# Patient Record
Sex: MALE | Race: WHITE
[De-identification: names, ages, dates, MRNs, and addresses within clinical notes are randomized per-mention and may not be internally consistent; named-entity substitution may affect disease eponyms.]

---

## 2020-07-16 ENCOUNTER — HOSPITAL ENCOUNTER (OUTPATIENT)
Dept: HOSPITAL 84 - D.M2 | Age: 70
LOS: 1 days | End: 2020-07-17
Attending: INTERNAL MEDICINE
Payer: COMMERCIAL

## 2020-07-16 VITALS — SYSTOLIC BLOOD PRESSURE: 87 MMHG | DIASTOLIC BLOOD PRESSURE: 49 MMHG

## 2020-07-16 VITALS — DIASTOLIC BLOOD PRESSURE: 88 MMHG | SYSTOLIC BLOOD PRESSURE: 127 MMHG

## 2020-07-16 VITALS — HEIGHT: 69 IN | BODY MASS INDEX: 26.57 KG/M2 | WEIGHT: 179.38 LBS

## 2020-07-16 VITALS — SYSTOLIC BLOOD PRESSURE: 127 MMHG | DIASTOLIC BLOOD PRESSURE: 88 MMHG

## 2020-07-16 VITALS — SYSTOLIC BLOOD PRESSURE: 99 MMHG | DIASTOLIC BLOOD PRESSURE: 60 MMHG

## 2020-07-16 DIAGNOSIS — I10: ICD-10-CM

## 2020-07-16 DIAGNOSIS — N04.9: Primary | ICD-10-CM

## 2020-07-16 DIAGNOSIS — E78.2: ICD-10-CM

## 2020-07-16 LAB
ANION GAP SERPL CALC-SCNC: 7.6 MMOL/L (ref 8–16)
APTT BLD: 31.2 SECONDS (ref 22.8–39.4)
BUN SERPL-MCNC: 32 MG/DL (ref 7–18)
CALCIUM SERPL-MCNC: 8.3 MG/DL (ref 8.5–10.1)
CHLORIDE SERPL-SCNC: 108 MMOL/L (ref 98–107)
CO2 SERPL-SCNC: 28.2 MMOL/L (ref 21–32)
CREAT SERPL-MCNC: 1.6 MG/DL (ref 0.6–1.3)
ERYTHROCYTE [DISTWIDTH] IN BLOOD BY AUTOMATED COUNT: 18.1 % (ref 11.5–14.5)
ERYTHROCYTE [DISTWIDTH] IN BLOOD BY AUTOMATED COUNT: 18.3 % (ref 11.5–14.5)
ERYTHROCYTE [DISTWIDTH] IN BLOOD BY AUTOMATED COUNT: 18.3 % (ref 11.5–14.5)
GLUCOSE SERPL-MCNC: 118 MG/DL (ref 74–106)
HCT VFR BLD CALC: 41.4 % (ref 42–54)
HCT VFR BLD CALC: 42.3 % (ref 42–54)
HCT VFR BLD CALC: 46.5 % (ref 42–54)
HGB BLD-MCNC: 13.6 G/DL (ref 13.5–17.5)
HGB BLD-MCNC: 14.1 G/DL (ref 13.5–17.5)
HGB BLD-MCNC: 15.6 G/DL (ref 13.5–17.5)
INR PPP: 0.88 (ref 0.85–1.17)
LYMPHOCYTES NFR BLD AUTO: 19.5 % (ref 15–50)
LYMPHOCYTES NFR BLD AUTO: 23 % (ref 15–50)
LYMPHOCYTES NFR BLD AUTO: 23.1 % (ref 15–50)
MCH RBC QN AUTO: 30.3 PG (ref 26–34)
MCH RBC QN AUTO: 30.6 PG (ref 26–34)
MCH RBC QN AUTO: 30.6 PG (ref 26–34)
MCHC RBC AUTO-ENTMCNC: 32.9 G/DL (ref 31–37)
MCHC RBC AUTO-ENTMCNC: 33.3 G/DL (ref 31–37)
MCHC RBC AUTO-ENTMCNC: 33.5 G/DL (ref 31–37)
MCV RBC: 91.2 FL (ref 80–100)
MCV RBC: 91.8 FL (ref 80–100)
MCV RBC: 92.2 FL (ref 80–100)
NEUTROPHILS NFR BLD AUTO: 66.9 % (ref 40–80)
NEUTROPHILS NFR BLD AUTO: 67.6 % (ref 40–80)
NEUTROPHILS NFR BLD AUTO: 72.7 % (ref 40–80)
OSMOLALITY SERPL CALC.SUM OF ELEC: 285 MOSM/KG (ref 275–300)
PLATELET # BLD: 340 10X3/UL (ref 130–400)
PLATELET # BLD: 351 10X3/UL (ref 130–400)
PLATELET # BLD: 409 10X3/UL (ref 130–400)
PMV BLD AUTO: 8.2 FL (ref 7.4–10.4)
PMV BLD AUTO: 8.4 FL (ref 7.4–10.4)
PMV BLD AUTO: 8.6 FL (ref 7.4–10.4)
POTASSIUM SERPL-SCNC: 4.8 MMOL/L (ref 3.5–5.1)
PROTHROMBIN TIME: 11.9 SECONDS (ref 11.6–15)
RBC # BLD AUTO: 4.49 10X6/UL (ref 4.2–6.1)
RBC # BLD AUTO: 4.61 10X6/UL (ref 4.2–6.1)
RBC # BLD AUTO: 5.1 10X6/UL (ref 4.2–6.1)
SODIUM SERPL-SCNC: 139 MMOL/L (ref 136–145)
WBC # BLD AUTO: 6.6 10X3/UL (ref 4.8–10.8)
WBC # BLD AUTO: 7.8 10X3/UL (ref 4.8–10.8)
WBC # BLD AUTO: 8 10X3/UL (ref 4.8–10.8)

## 2020-07-16 NOTE — NUR
PATIENT IS RESTING COMFORTABLY IN BED. HIS HEAD ACHE IS GETTING BETTER. HIS
WIFE IS IN THE ROOM. HE STATES HE IS NOT A DIABETIC, AND WE ARE CHECKING HIS
BLOOD SUGARS. BP IS NO THE LOW SIDE OF NORMAL. WE WILL CONTINUE TO MONITOR HIS
BLOOD PRESSURE.

## 2020-07-17 VITALS — DIASTOLIC BLOOD PRESSURE: 69 MMHG | SYSTOLIC BLOOD PRESSURE: 112 MMHG

## 2020-07-17 VITALS — SYSTOLIC BLOOD PRESSURE: 93 MMHG | DIASTOLIC BLOOD PRESSURE: 57 MMHG

## 2020-07-17 VITALS — DIASTOLIC BLOOD PRESSURE: 78 MMHG | SYSTOLIC BLOOD PRESSURE: 185 MMHG

## 2020-07-17 LAB
ERYTHROCYTE [DISTWIDTH] IN BLOOD BY AUTOMATED COUNT: 18.1 % (ref 11.5–14.5)
HCT VFR BLD CALC: 39.9 % (ref 42–54)
HGB BLD-MCNC: 13.1 G/DL (ref 13.5–17.5)
LYMPHOCYTES NFR BLD AUTO: 26.2 % (ref 15–50)
MCH RBC QN AUTO: 30.3 PG (ref 26–34)
MCHC RBC AUTO-ENTMCNC: 32.8 G/DL (ref 31–37)
MCV RBC: 92.1 FL (ref 80–100)
NEUTROPHILS NFR BLD AUTO: 61.5 % (ref 40–80)
PLATELET # BLD: 350 10X3/UL (ref 130–400)
PMV BLD AUTO: 8.5 FL (ref 7.4–10.4)
RBC # BLD AUTO: 4.33 10X6/UL (ref 4.2–6.1)
WBC # BLD AUTO: 6.4 10X3/UL (ref 4.8–10.8)

## 2020-07-17 NOTE — NUR
PATIENT IS RESTING IN BED.  HE DID SLEEP SOME. HE IS VOIDING DARK URINE. I
ADVISED HIM TO DRINK MORE FLUIDS. WE WILL MONITOR HIS OUT PUT AND KIDNEY
FUNCTION.

## 2020-07-17 NOTE — NUR
1041 TAKEN TO CAR VIA   SPOUSE TO DRIVE HOME VERBAL AND WRITTEN DISCHARGE
INSTRUCTIONS GIVEN BOTH PT AND SPOUSE VERBBALIZED UNDERSTANDING

## 2020-08-25 ENCOUNTER — HOSPITAL ENCOUNTER (OUTPATIENT)
Dept: HOSPITAL 84 - D.ECHO | Age: 70
Discharge: HOME | End: 2020-08-25
Attending: INTERNAL MEDICINE
Payer: COMMERCIAL

## 2020-08-25 VITALS — BODY MASS INDEX: 26.5 KG/M2

## 2020-08-25 DIAGNOSIS — E85.9: Primary | ICD-10-CM

## 2020-08-26 NOTE — EC
PATIENT:YVON LOPEZ               DATE OF SERVICE: 08/25/20
SEX: M                                  MEDICAL RECORD: [id number]
DATE OF BIRTH: [birth year]                        [facility]          
AGE OF PATIENT: 69                             ADMISSION DATE: 08/25/20
 
REFERRING PHYSICIAN:                               
 
INTERPRETING PHYSICIAN: CHELA STEWART MD          
 
 
 
                             ECHOCARDIOGRAM REPORT
  ECHO CHARGES 4               ECHO COMPLETE                 Date: 08/25/20
 
 
 
CLINICAL DIAGNOSIS: AMYLOID                       
 
                         ECHOCARDIOGRAPHIC MEASUREMENTS
      (adult normal given)
   AC root (d.<3.7cm) 3.7  cm   LV Septum d (<1.2 cm> 1.5  cm
      Valve Excursion 2.0  cm     LV Septum (systole) 1.6  cm
Left Atria (s.<4.0cm> 3.5  cm          LVPW d(<1.2cm) 0.9  cm
        RV (d.<2.3cm) 2.4  cm           LVPW (sytole) 1.5  cm
  LV diastole(<5.6CM) 4.9  cm       MV E-F(>70mm/sec)      cm
           LV systole 3.9  cm           LVOT Diameter 1.6  cm
       MV exc.(>10mm)      cm
Est.ejection fraction (50-75%)     %
 
   DOPPLER:
     LVIT      cm/sec A 51   cm/sec E 41    cm/sec
       LA      cm/sec      RVSP 28.3 mmHg
     LVOT 172  cm/sec   AOP1/2T      m/s
  Asc. Ao 145  cm/sec
     RVOT 73   cm/sec
       RA      cm/sec
       PA 66   cm/sec
 AV Gradient Peak 8.4  mmHg  AV Mean 4.6  mmHg  AV Area 2.7  cm
 MV Gradient Peak 1.8  mmHg  MV Mean 0.8  mmHg  MV Area      cm
   COMMENTS:                                              
 
 
 Cardiac Sonographer: Jessica               FRANKLIN DOWNS             
      Cardiologist: 3          Dr. Bro             
             TAPE# PACS           
                                       Pericardial Effusion N                        
 
 
DATE OF SERVICE:  08/25/2020
 
Adequate 2D, color flow imaging, spectral Doppler, and M-Mode.
 
LVH is present.  LV internal dimension is normal.  Wall motion is normal.  EF is
greater than or equal to 55%.  Aortic valve is tricuspid.  No evidence of
stenosis by Doppler interrogation.  Left atrium is normal.  Mitral valve shows
no prolapse.  Trace MR.  Right-sided chambers are grossly normal.  Trace TR.
 
TRANSINT:[id number] Voice Confirmation ID: [id number] DOCUMENT ID: [id number]
 
 
 
ECHOCARDIOGRAM REPORT                          [id number]    JESSICA,YVON CHELA VICK MD          
 
 
 
Electronically Signed by CHELA STEWART on 08/26/20 at 0818
 
 
 
 
 
 
 
 
 
 
 
 
 
 
 
 
 
 
 
 
 
 
 
 
 
 
 
 
 
 
 
 
 
 
 
 
 
 
 
 
CC:                                                             [id number]
DICTATION DATE: 08/25/20 1243     :     08/25/20 2026      DEP CLI 
                                                                      08/25/20
[address] [place] [address]

## 2020-08-31 ENCOUNTER — HOSPITAL ENCOUNTER (OUTPATIENT)
Dept: HOSPITAL 84 - D.SP | Age: 70
Discharge: HOME | End: 2020-08-31
Attending: INTERNAL MEDICINE
Payer: COMMERCIAL

## 2020-08-31 VITALS
WEIGHT: 168.35 LBS | BODY MASS INDEX: 24.93 KG/M2 | WEIGHT: 168.35 LBS | HEIGHT: 69 IN | BODY MASS INDEX: 24.93 KG/M2 | HEIGHT: 69 IN

## 2020-08-31 DIAGNOSIS — R11.0: ICD-10-CM

## 2020-08-31 DIAGNOSIS — Z01.89: ICD-10-CM

## 2020-08-31 DIAGNOSIS — E85.9: Primary | ICD-10-CM

## 2020-08-31 LAB
ANION GAP SERPL CALC-SCNC: 12.3 MMOL/L (ref 8–16)
APTT BLD: 32.6 SECONDS (ref 22.8–39.4)
BASOPHILS NFR BLD AUTO: 0.8 % (ref 0–2)
BUN SERPL-MCNC: 54 MG/DL (ref 7–18)
CALCIUM SERPL-MCNC: 8.2 MG/DL (ref 8.5–10.1)
CHLORIDE SERPL-SCNC: 104 MMOL/L (ref 98–107)
CO2 SERPL-SCNC: 24.3 MMOL/L (ref 21–32)
CREAT SERPL-MCNC: 2.9 MG/DL (ref 0.6–1.3)
EOSINOPHIL NFR BLD: 6.1 % (ref 0–7)
ERYTHROCYTE [DISTWIDTH] IN BLOOD BY AUTOMATED COUNT: 18.7 % (ref 11.5–14.5)
GLUCOSE SERPL-MCNC: 102 MG/DL (ref 74–106)
HCT VFR BLD CALC: 44.8 % (ref 42–54)
HGB BLD-MCNC: 15.7 G/DL (ref 13.5–17.5)
IMM GRANULOCYTES NFR BLD: 5 % (ref 0–5)
INR PPP: 0.98 (ref 0.85–1.17)
LYMPHOCYTES NFR BLD AUTO: 15 % (ref 15–50)
MCH RBC QN AUTO: 30.2 PG (ref 26–34)
MCHC RBC AUTO-ENTMCNC: 35 G/DL (ref 31–37)
MCV RBC: 86.2 FL (ref 80–100)
MONOCYTES NFR BLD: 5.2 % (ref 2–11)
NEUTROPHILS NFR BLD AUTO: 67.9 % (ref 40–80)
OSMOLALITY SERPL CALC.SUM OF ELEC: 288 MOSM/KG (ref 275–300)
PLATELET # BLD: 313 10X3/UL (ref 130–400)
PMV BLD AUTO: 9.3 FL (ref 7.4–10.4)
POTASSIUM SERPL-SCNC: 3.6 MMOL/L (ref 3.5–5.1)
PROTHROMBIN TIME: 13 SECONDS (ref 11.6–15)
RBC # BLD AUTO: 5.2 10X6/UL (ref 4.2–6.1)
SODIUM SERPL-SCNC: 137 MMOL/L (ref 136–145)
WBC # BLD AUTO: 7.2 10X3/UL (ref 4.8–10.8)

## 2020-09-09 ENCOUNTER — HOSPITAL ENCOUNTER (INPATIENT)
Dept: HOSPITAL 84 - D.ER | Age: 70
LOS: 3 days | Discharge: HOME | DRG: 193 | End: 2020-09-12
Attending: FAMILY MEDICINE | Admitting: FAMILY MEDICINE
Payer: COMMERCIAL

## 2020-09-09 VITALS
BODY MASS INDEX: 23.55 KG/M2 | BODY MASS INDEX: 23.55 KG/M2 | BODY MASS INDEX: 23.55 KG/M2 | WEIGHT: 159 LBS | HEIGHT: 69 IN | HEIGHT: 69 IN | WEIGHT: 159 LBS

## 2020-09-09 VITALS — DIASTOLIC BLOOD PRESSURE: 71 MMHG | SYSTOLIC BLOOD PRESSURE: 99 MMHG

## 2020-09-09 DIAGNOSIS — E85.9: ICD-10-CM

## 2020-09-09 DIAGNOSIS — I12.9: ICD-10-CM

## 2020-09-09 DIAGNOSIS — J18.9: Primary | ICD-10-CM

## 2020-09-09 DIAGNOSIS — N18.9: ICD-10-CM

## 2020-09-09 DIAGNOSIS — E43: ICD-10-CM

## 2020-09-09 DIAGNOSIS — M35.3: ICD-10-CM

## 2020-09-09 DIAGNOSIS — N17.9: ICD-10-CM

## 2020-09-09 DIAGNOSIS — K21.9: ICD-10-CM

## 2020-09-09 DIAGNOSIS — E87.6: ICD-10-CM

## 2020-09-09 DIAGNOSIS — F32.9: ICD-10-CM

## 2020-09-09 LAB
ALBUMIN SERPL-MCNC: 1 G/DL (ref 3.4–5)
ALP SERPL-CCNC: 102 U/L (ref 30–120)
ALT SERPL-CCNC: 35 U/L (ref 10–68)
AMYLASE SERPL-CCNC: 50 U/L (ref 25–115)
ANION GAP SERPL CALC-SCNC: 13.8 MMOL/L (ref 8–16)
APTT BLD: 37.8 SECONDS (ref 22.8–39.4)
BASOPHILS NFR BLD AUTO: 0.2 % (ref 0–2)
BILIRUB SERPL-MCNC: 0.45 MG/DL (ref 0.2–1.3)
BUN SERPL-MCNC: 43 MG/DL (ref 7–18)
CALCIUM SERPL-MCNC: 8 MG/DL (ref 8.5–10.1)
CHLORIDE SERPL-SCNC: 103 MMOL/L (ref 98–107)
CO2 SERPL-SCNC: 23.2 MMOL/L (ref 21–32)
CREAT SERPL-MCNC: 2.8 MG/DL (ref 0.6–1.3)
EOSINOPHIL NFR BLD: 0.1 % (ref 0–7)
ERYTHROCYTE [DISTWIDTH] IN BLOOD BY AUTOMATED COUNT: 18.9 % (ref 11.5–14.5)
GLOBULIN SER-MCNC: 4.1 G/L
GLUCOSE SERPL-MCNC: 95 MG/DL (ref 74–106)
HCT VFR BLD CALC: 43.8 % (ref 42–54)
HGB BLD-MCNC: 15.5 G/DL (ref 13.5–17.5)
IMM GRANULOCYTES NFR BLD: 0.2 % (ref 0–5)
INR PPP: 1.04 (ref 0.85–1.17)
LIPASE SERPL-CCNC: 150 U/L (ref 73–393)
LYMPHOCYTES NFR BLD AUTO: 8.3 % (ref 15–50)
MCH RBC QN AUTO: 30.4 PG (ref 26–34)
MCHC RBC AUTO-ENTMCNC: 35.4 G/DL (ref 31–37)
MCV RBC: 85.9 FL (ref 80–100)
MONOCYTES NFR BLD: 5.4 % (ref 2–11)
NEUTROPHILS NFR BLD AUTO: 85.8 % (ref 40–80)
OSMOLALITY SERPL CALC.SUM OF ELEC: 284 MOSM/KG (ref 275–300)
PLATELET # BLD: 304 10X3/UL (ref 130–400)
PMV BLD AUTO: 10 FL (ref 7.4–10.4)
POTASSIUM SERPL-SCNC: 3 MMOL/L (ref 3.5–5.1)
PROT SERPL-MCNC: 5.1 G/DL (ref 6.4–8.2)
PROTHROMBIN TIME: 13.5 SECONDS (ref 11.6–15)
RBC # BLD AUTO: 5.1 10X6/UL (ref 4.2–6.1)
SODIUM SERPL-SCNC: 137 MMOL/L (ref 136–145)
TROPONIN I SERPL-MCNC: 0.11 NG/ML (ref 0–0.06)
WBC # BLD AUTO: 10.8 10X3/UL (ref 4.8–10.8)

## 2020-09-09 NOTE — OP
PATIENT NAME:  YVON LOPEZ                         MEDICAL RECORD: [id number]
:[birth year]                                             LOCATION:D.M2     D.2104
                                                         ADMISSION DATE:20
SURGEON:  GUILLERMO SUBRAMANIAN MD          
 
 
DATE OF OPERATION:  2020
 
PREOPERATIVE DIAGNOSES:
1.  Amyloidosis.
2.  Nephrotic syndrome.
3.  Acute kidney injury.
4.  Hypertension.
5.  Gastroesophageal reflux disease.
6.  Polymyalgia.
 
POSTOPERATIVE DIAGNOSES:
1.  Amyloidosis.
2.  Nephrotic syndrome.
3.  Acute kidney injury.
4.  Hypertension.
5.  Gastroesophageal reflux disease.
6.  Polymyalgia.
 
PROCEDURES:
1.  Left subclavian vein port placements.
2.  Fluoroscopic interpretation.
 
SURGEON:  Guillermo Subramanian MD
 
REPORT OF PROCEDURE:  The patient's left chest was prepped and draped in sterile
fashion.  A needle was used to cannulate the left subclavian vein and a
guidewire was advanced with ease.  Fluoro was used to note that the wire was in
good position in the venous system.  A skin incision was made on the left
superior lateral chest and a subcutaneous pouch was made over the pectoral
fascia.  The catheter was then tunneled between this pouch and the wire exit
site.  The port was sutured to the pectoral fascia with interrupted 3-0 Prolenes
times 2.  The catheter was cut with a beveled tip at 25 cm.  The dilator trocar
device was then placed over the wire and the wire and dilator were removed.  The
catheter tip was advanced through the trocar and the trocar was removed.  The
catheter tip was noted to be in good position at the right atrial superior vena
caval junction.  The catheter aspirated nonpulsatile dark blood and flushed
easily with heparinized saline.  This was sutured into place with interrupted
subcutaneous 3-0 Vicryl and the skin was closed with running subcutaneous 5-0
Monocryl.
 
COMPLICATIONS:  None.
 
CONDITION:  Stable.
 
ANESTHESIA:  General endotracheal and local.
 
BLOOD LOSS:  Minimal.
 
TRANSINT:[id number] Voice Confirmation ID: [id number] DOCUMENT ID: [id number]
                                           
 
 
 
OPERATIVE REPORT                               [id number]    YVON LOPEZ       
 
 
                                           GUILLERMO SUBRAMANIAN MD          
 
 
 
 
 
 
 
 
 
 
 
 
 
 
 
 
 
 
 
 
 
 
 
 
 
 
 
 
 
 
 
 
 
 
 
 
 
 
 
 
 
 
 
 
 
 
CC:                                                             [id number]
DICTATION DATE: 20 1045     :     20 1516      DIS IN  
                                                                      20
[id number] [place] [address]

## 2020-09-10 VITALS — SYSTOLIC BLOOD PRESSURE: 116 MMHG | DIASTOLIC BLOOD PRESSURE: 68 MMHG

## 2020-09-10 VITALS — SYSTOLIC BLOOD PRESSURE: 107 MMHG | DIASTOLIC BLOOD PRESSURE: 62 MMHG

## 2020-09-10 VITALS — DIASTOLIC BLOOD PRESSURE: 57 MMHG | SYSTOLIC BLOOD PRESSURE: 96 MMHG

## 2020-09-10 VITALS — SYSTOLIC BLOOD PRESSURE: 120 MMHG | DIASTOLIC BLOOD PRESSURE: 70 MMHG

## 2020-09-10 VITALS — SYSTOLIC BLOOD PRESSURE: 129 MMHG | DIASTOLIC BLOOD PRESSURE: 59 MMHG

## 2020-09-10 VITALS — DIASTOLIC BLOOD PRESSURE: 41 MMHG | SYSTOLIC BLOOD PRESSURE: 95 MMHG

## 2020-09-10 VITALS — SYSTOLIC BLOOD PRESSURE: 114 MMHG | DIASTOLIC BLOOD PRESSURE: 74 MMHG

## 2020-09-10 LAB
ALBUMIN SERPL-MCNC: 0.8 G/DL (ref 3.4–5)
ALP SERPL-CCNC: 93 U/L (ref 30–120)
ALT SERPL-CCNC: 29 U/L (ref 10–68)
AMORPHOUS SEDIMENT: (no result) /LPF
ANION GAP SERPL CALC-SCNC: 11.9 MMOL/L (ref 8–16)
BACTERIA #/AREA URNS HPF: (no result) /HPF
BACTERIA #/AREA URNS HPF: (no result) /HPF
BASOPHILS NFR BLD AUTO: 0.5 % (ref 0–2)
BILIRUB SERPL-MCNC: 0.33 MG/DL (ref 0.2–1.3)
BILIRUB SERPL-MCNC: NEGATIVE MG/DL
BILIRUB SERPL-MCNC: NEGATIVE MG/DL
BUN SERPL-MCNC: 39 MG/DL (ref 7–18)
CALCIUM SERPL-MCNC: 7.6 MG/DL (ref 8.5–10.1)
CHLORIDE SERPL-SCNC: 108 MMOL/L (ref 98–107)
CHOLEST/HDLC SERPL: 20.4 RATIO (ref 2.3–4.9)
CK MB SERPL-MCNC: 3.4 U/L (ref 0–3.6)
CK MB SERPL-MCNC: 3.6 U/L (ref 0–3.6)
CK SERPL-CCNC: 112 UL (ref 21–232)
CK SERPL-CCNC: 95 UL (ref 21–232)
CO2 SERPL-SCNC: 23.3 MMOL/L (ref 21–32)
CREAT SERPL-MCNC: 2.5 MG/DL (ref 0.6–1.3)
EOSINOPHIL NFR BLD: 0.8 % (ref 0–7)
ERYTHROCYTE [DISTWIDTH] IN BLOOD BY AUTOMATED COUNT: 19 % (ref 11.5–14.5)
GLOBULIN SER-MCNC: 3.6 G/L
GLUCOSE SERPL-MCNC: 74 MG/DL (ref 74–106)
GRAN CASTS #/AREA URNS LPF: (no result) /LPF
GRAN CASTS #/AREA URNS LPF: (no result) /LPF
HCT VFR BLD CALC: 40.1 % (ref 42–54)
HDLC SERPL-MCNC: 21 MG/DL (ref 32–96)
HGB BLD-MCNC: 14 G/DL (ref 13.5–17.5)
IMM GRANULOCYTES NFR BLD: 0.2 % (ref 0–5)
KETONES UR STRIP-MCNC: (no result) MG/DL
KETONES UR STRIP-MCNC: NEGATIVE MG/DL
LDL-HDL RATIO: 16.9 RATIO (ref 1.5–3.5)
LDLC SERPL-MCNC: 354 MG/DL (ref 0–100)
LYMPHOCYTES NFR BLD AUTO: 11.9 % (ref 15–50)
MAGNESIUM SERPL-MCNC: 2 MG/DL (ref 1.8–2.4)
MCH RBC QN AUTO: 30 PG (ref 26–34)
MCHC RBC AUTO-ENTMCNC: 34.9 G/DL (ref 31–37)
MCV RBC: 86.1 FL (ref 80–100)
MONOCYTES NFR BLD: 6.1 % (ref 2–11)
NEUTROPHILS NFR BLD AUTO: 80.5 % (ref 40–80)
NITRITE UR-MCNC: NEGATIVE MG/ML
NITRITE UR-MCNC: NEGATIVE MG/ML
OSMOLALITY SERPL CALC.SUM OF ELEC: 286 MOSM/KG (ref 275–300)
PH UR STRIP: 5 [PH] (ref 5–6)
PH UR STRIP: 5 [PH] (ref 5–8)
PLATELET # BLD: 274 10X3/UL (ref 130–400)
PMV BLD AUTO: 9.2 FL (ref 7.4–10.4)
POTASSIUM SERPL-SCNC: 3.2 MMOL/L (ref 3.5–5.1)
PROT SERPL-MCNC: 4.4 G/DL (ref 6.4–8.2)
RBC # BLD AUTO: 4.66 10X6/UL (ref 4.2–6.1)
SODIUM SERPL-SCNC: 140 MMOL/L (ref 136–145)
SQUAMOUS #/AREA URNS HPF: (no result) /HPF (ref 0–5)
TRIGL SERPL-MCNC: 272 MG/DL (ref 30–200)
TROPONIN I SERPL-MCNC: 0.09 NG/ML (ref 0–0.06)
TROPONIN I SERPL-MCNC: 0.13 NG/ML (ref 0–0.06)
UROBILINOGEN UR-MCNC: NORMAL MG/DL
UROBILINOGEN UR-MCNC: NORMAL MG/DL (ref ?–2)
WBC # BLD AUTO: 8.4 10X3/UL (ref 4.8–10.8)
WBC #/AREA URNS HPF: (no result) /HPF (ref 0–1)
WBC #/AREA URNS HPF: (no result) /HPF (ref 0–5)

## 2020-09-10 NOTE — NUR
RECIEVE REPORT. ALERT AND ORIENTED X4. SITTING UP IN BED. FAMILY AT BEDSIDE.
FOLLOW ELECTROLYTE PROTOCOL. DENIES ANY NEEDS. CONTINUE PLAN OF CARE AND
SAFETY PRECAUTIONS.

## 2020-09-10 NOTE — NUR
REPORT RECEIVED, WILL CONTINUE POC. PATIENT IS AAOX4, SITTING UP ON SIDE OF
BED. NO S/S OF DISTRESS OBSERVED, RR EVEN AND UNLABORED ON ROOM AIR. PATIENT
DENIES NEEDS AT THIS TIME. CL IN REACH, BED LOCKED AND LOWERED. WILL CTM.

## 2020-09-10 NOTE — NUR
ALERT AND ORIENTED X4. SITTING UP IN BED. CONSENTS FOR PORT PLACEMENT SIGNED
ON CHART. DENIES ANY NEEDS. CONTINUE PLAN OF CARE AND SAFETY PRECAUTIONS.

## 2020-09-11 VITALS — DIASTOLIC BLOOD PRESSURE: 5 MMHG | SYSTOLIC BLOOD PRESSURE: 95 MMHG

## 2020-09-11 VITALS — SYSTOLIC BLOOD PRESSURE: 85 MMHG | DIASTOLIC BLOOD PRESSURE: 46 MMHG

## 2020-09-11 VITALS — SYSTOLIC BLOOD PRESSURE: 103 MMHG | DIASTOLIC BLOOD PRESSURE: 50 MMHG

## 2020-09-11 VITALS — SYSTOLIC BLOOD PRESSURE: 104 MMHG | DIASTOLIC BLOOD PRESSURE: 45 MMHG

## 2020-09-11 VITALS — DIASTOLIC BLOOD PRESSURE: 63 MMHG | SYSTOLIC BLOOD PRESSURE: 121 MMHG

## 2020-09-11 VITALS — SYSTOLIC BLOOD PRESSURE: 121 MMHG | DIASTOLIC BLOOD PRESSURE: 63 MMHG

## 2020-09-11 VITALS — SYSTOLIC BLOOD PRESSURE: 92 MMHG | DIASTOLIC BLOOD PRESSURE: 57 MMHG

## 2020-09-11 LAB
ALBUMIN SERPL-MCNC: 1.5 G/DL (ref 3.4–5)
ALP SERPL-CCNC: 84 U/L (ref 30–120)
ALT SERPL-CCNC: 19 U/L (ref 10–68)
ANION GAP SERPL CALC-SCNC: 13.7 MMOL/L (ref 8–16)
BASOPHILS NFR BLD AUTO: 0.7 % (ref 0–2)
BILIRUB SERPL-MCNC: 0.44 MG/DL (ref 0.2–1.3)
BUN SERPL-MCNC: 36 MG/DL (ref 7–18)
CALCIUM SERPL-MCNC: 7.2 MG/DL (ref 8.5–10.1)
CHLORIDE SERPL-SCNC: 111 MMOL/L (ref 98–107)
CK MB SERPL-MCNC: 3.6 U/L (ref 0–3.6)
CK SERPL-CCNC: 94 UL (ref 21–232)
CO2 SERPL-SCNC: 20.7 MMOL/L (ref 21–32)
CREAT SERPL-MCNC: 2.5 MG/DL (ref 0.6–1.3)
CREATININE - URINE: 105.6 MG/DL (ref 30–125)
EOSINOPHIL NFR BLD: 1.5 % (ref 0–7)
ERYTHROCYTE [DISTWIDTH] IN BLOOD BY AUTOMATED COUNT: 19.4 % (ref 11.5–14.5)
GLOBULIN SER-MCNC: 2.8 G/L
GLUCOSE SERPL-MCNC: 71 MG/DL (ref 74–106)
HCT VFR BLD CALC: 32.9 % (ref 42–54)
HGB BLD-MCNC: 11.3 G/DL (ref 13.5–17.5)
IMM GRANULOCYTES NFR BLD: 0.4 % (ref 0–5)
LYMPHOCYTES NFR BLD AUTO: 11.5 % (ref 15–50)
MAGNESIUM SERPL-MCNC: 2 MG/DL (ref 1.8–2.4)
MCH RBC QN AUTO: 29.9 PG (ref 26–34)
MCHC RBC AUTO-ENTMCNC: 34.3 G/DL (ref 31–37)
MCV RBC: 87 FL (ref 80–100)
MONOCYTES NFR BLD: 7.6 % (ref 2–11)
NEUTROPHILS NFR BLD AUTO: 78.3 % (ref 40–80)
OSMOLALITY SERPL CALC.SUM OF ELEC: 288 MOSM/KG (ref 275–300)
PLATELET # BLD: 263 10X3/UL (ref 130–400)
PMV BLD AUTO: 9.3 FL (ref 7.4–10.4)
POTASSIUM SERPL-SCNC: 3.4 MMOL/L (ref 3.5–5.1)
PROT SERPL-MCNC: 4.3 G/DL (ref 6.4–8.2)
PROT UR-MCNC: 3371.2 MG/DL (ref 0–11.9)
PROT/CREAT UR: 31.9 MG/G
RBC # BLD AUTO: 3.78 10X6/UL (ref 4.2–6.1)
SODIUM SERPL-SCNC: 142 MMOL/L (ref 136–145)
TROPONIN I SERPL-MCNC: 0.23 NG/ML (ref 0–0.06)
WBC # BLD AUTO: 7.5 10X3/UL (ref 4.8–10.8)

## 2020-09-11 PROCEDURE — 02HV33Z INSERTION OF INFUSION DEVICE INTO SUPERIOR VENA CAVA, PERCUTANEOUS APPROACH: ICD-10-PCS | Performed by: SURGERY

## 2020-09-11 PROCEDURE — B5181ZA FLUOROSCOPY OF SUPERIOR VENA CAVA USING LOW OSMOLAR CONTRAST, GUIDANCE: ICD-10-PCS | Performed by: SURGERY

## 2020-09-11 NOTE — NUR
AM ROUNDING DONE WITH WIFE AT BEDSIDE. PATIENT IS NPO FOR SURGERY TODAY. EKG
DONE. PATIENT UP TO SINK FOR AM CARE. ON HEART MONITOR SHOWING SR. ON ROOM
AIR. RIGHT AC SEEN WITH SALINE LOCK.

## 2020-09-11 NOTE — NUR
RECEIVED BACK FROM RECOVERY. DRESSING TO LEFT PORT AREA CLEAN AND DRY, INTACT.
FREQUENT VITAL SIGNS BEING DONE.

## 2020-09-12 VITALS — SYSTOLIC BLOOD PRESSURE: 118 MMHG | DIASTOLIC BLOOD PRESSURE: 68 MMHG

## 2020-09-12 VITALS — DIASTOLIC BLOOD PRESSURE: 64 MMHG | SYSTOLIC BLOOD PRESSURE: 115 MMHG

## 2020-09-12 VITALS — DIASTOLIC BLOOD PRESSURE: 70 MMHG | SYSTOLIC BLOOD PRESSURE: 116 MMHG

## 2020-09-12 LAB
ALBUMIN SERPL-MCNC: 1.2 G/DL (ref 3.4–5)
ALP SERPL-CCNC: 120 U/L (ref 30–120)
ALT SERPL-CCNC: 20 U/L (ref 10–68)
ANION GAP SERPL CALC-SCNC: 13 MMOL/L (ref 8–16)
BASOPHILS NFR BLD AUTO: 0.8 % (ref 0–2)
BILIRUB SERPL-MCNC: 0.33 MG/DL (ref 0.2–1.3)
BUN SERPL-MCNC: 33 MG/DL (ref 7–18)
CALCIUM SERPL-MCNC: 7.5 MG/DL (ref 8.5–10.1)
CHLORIDE SERPL-SCNC: 110 MMOL/L (ref 98–107)
CO2 SERPL-SCNC: 21.6 MMOL/L (ref 21–32)
CREAT SERPL-MCNC: 2.7 MG/DL (ref 0.6–1.3)
CREAT UR-MCNC: 94 MG/DL
EOSINOPHIL NFR BLD: 2.1 % (ref 0–7)
ERYTHROCYTE [DISTWIDTH] IN BLOOD BY AUTOMATED COUNT: 19.7 % (ref 11.5–14.5)
GLOBULIN SER-MCNC: 3.2 G/L
GLUCOSE SERPL-MCNC: 83 MG/DL (ref 74–106)
HCT VFR BLD CALC: 37 % (ref 42–54)
HGB BLD-MCNC: 12.7 G/DL (ref 13.5–17.5)
IMM GRANULOCYTES NFR BLD: 0.3 % (ref 0–5)
LYMPHOCYTES NFR BLD AUTO: 11.1 % (ref 15–50)
MAGNESIUM SERPL-MCNC: 2.1 MG/DL (ref 1.8–2.4)
MCH RBC QN AUTO: 30.2 PG (ref 26–34)
MCHC RBC AUTO-ENTMCNC: 34.3 G/DL (ref 31–37)
MCV RBC: 88.1 FL (ref 80–100)
MICROALB/CREAT RATIO: (no result)
MICROALBUMIN UR-MCNC: (no result) UG/ML
MONOCYTES NFR BLD: 5.7 % (ref 2–11)
NEUTROPHILS NFR BLD AUTO: 80 % (ref 40–80)
OSMOLALITY SERPL CALC.SUM OF ELEC: 286 MOSM/KG (ref 275–300)
PLATELET # BLD: 265 10X3/UL (ref 130–400)
PMV BLD AUTO: 8.8 FL (ref 7.4–10.4)
POTASSIUM SERPL-SCNC: 3.6 MMOL/L (ref 3.5–5.1)
PROT SERPL-MCNC: 4.4 G/DL (ref 6.4–8.2)
RBC # BLD AUTO: 4.2 10X6/UL (ref 4.2–6.1)
SODIUM SERPL-SCNC: 141 MMOL/L (ref 136–145)
WBC # BLD AUTO: 7.3 10X3/UL (ref 4.8–10.8)

## 2020-09-12 NOTE — MORECARE
CASE MANAGEMENT DISCHARGE SUMMARY
 
 
PATIENT: YVON LOPEZ                   UNIT: [id number]
ACCOUNT#: [id number]                       ADM DATE: 20
AGE: 69     : 50  SEX: M            ROOM/BED: D.7294    
AUTHOR: JULIA,DOC                             PHYSICIAN:                               
 
REFERRING PHYSICIAN: GHADA ESTRADA MD          
DATE OF SERVICE: 20
Discharge Plan
 
 
Patient Name: YVON LOPEZ
Facility: [facility]:[place]
Encounter #: [id number]
Medical Record #: [id number]
: 1950
Planned Disposition: Home or Self Care
Anticipated Discharge Date: 20
 
Discharge Date: 
Expected LOS: 3
Initial Reviewer: MONTSE
Initial Review Date: 2020
Generated: 20  12:19 pm 
Comments
 
DCP- Discharge Planning
 
Updated by [id number]: Aditya Sena on 20  10:17 am CT
Patient Name: YVON LOPEZ                                     
Admission Status: ER   
Accout number: [id number]                              
Admission Date: 2020   
: 1950                                                        
Admission Diagnosis:NAUSEA WITH VOMITING, UNSPECIFIED   
Attending: ALBERTO                                                
Current LOS:  3   
  
Anticipated DC Date: 2020   
Planned Disposition: Home or Self Care   
Primary Insurance: [employer]   
  
  
Discharge Planning Comments:   
CM met with patient to complete initial dc planning assessment.  CM educated 
patient on the CM role and verbal consent given by patient to complete 
assessment.  CM verified patient's address, phone number, and emergency 
contact phone numbers.  Patient lives at  home with spouse, Emy Lopez.  
 
At discharge patient plans to return  home and feels this is a safe discharge.
  CM discussed availability of home health, rehab services, and medical 
equipment. Patient declines HH services at this time as well as DME, SNF, and 
IPR.  Informed patient that HH can be set up through his PCP if he later 
decides that he needs the service. Transportation provider at discharge will 
be his Wife, Emy Lopez [phone]. CM will continue to follow and will 
assist as needed with dc plans/needs.  
  
: Aditya Sena
 DCPIA - Discharge Planning Initial Assessment
 
Updated by [id number]: Aditya Sena on 20  11:05 am
*  Is the patient Alert and Oriented?
Yes
*  How many steps to enter\exit or inside your home? 3/0 *  PCP Dr. Adonay Gleason * 
Pharmacy
[facility] in [place]
*  Preadmission Environment
Home with Family
*  ADLs
Independent
*  Equipment
Walker
*  List name and contact numbers for known caregivers / representatives who 
currently or will assist patient after discharge:
Emy Lopez, [phone]
*  Verbal permission to speak to the caregivers and representatives has been 
obtained from the patient.
Yes
*  Community resources currently utilized
None
*  Please name any agencies selected above.
n/a
*  Additional services required to return to the preadmission environment?
No
*  Can the patient safely return to the preadmission environment?
Yes
*  Has this patient been hospitalized within the prior 30 days at any 
hospital?
No
 
 
 
 
 
 
 
Last DP export: 20  10:12 a
Patient Name: YVON LOPEZ
 
Encounter #: [id number]
Page [id number]
 
 
 
 
 
Electronically Signed by KARIME DUMONT on 20 at 1119
 
 
 
 
 
 
**All edits/amendments must be made on the electronic document**
 
DICTATION DATE: 20     : LESLIE  20 111     
RPT#: [id number]                                DC DATE:        
                                               STATUS: ADM IN  
[facility]
 [place] [address]
***END OF REPORT***

## 2020-09-12 NOTE — MORECARE
CASE MANAGEMENT DISCHARGE SUMMARY
 
 
PATIENT: YVON LOPEZ                   UNIT: [id number]
ACCOUNT#: [id number]                       ADM DATE: 20
AGE: 69     : 50  SEX: M            ROOM/BED: D.2104    
AUTHOR: KARIME DUMONT                             PHYSICIAN:                               
 
REFERRING PHYSICIAN: GHADA ESTRADA MD          
DATE OF SERVICE: 20
Discharge Plan
 
 
Patient Name: YVON LOPEZ
Facility: [facility]:[place]
Encounter #: [id number]
Medical Record #: [id number]
: 1950
Planned Disposition: Home or Self Care
Anticipated Discharge Date: 20
 
Discharge Date: 
Expected LOS: 3
Initial Reviewer: [id number]
Initial Review Date: 2020
Generated: 20  12:04 pm 
  
 
 
 
 
 
 
 
Patient Name: YVON LOPEZ
 
Encounter #: [id number]
Page [id number]
 
 
 
 
 
Electronically Signed by KARIME DUMONT on 20 at 1105
 
 
 
 
 
 
**All edits/amendments must be made on the electronic document**
 
DICTATION DATE: 20 1104     : LESLIE  20 1104     
RPT#: [id number]                                DC DATE:        
                                               STATUS: ADM IN  
[facility]
 [place] [address]
***END OF REPORT***

## 2020-09-12 NOTE — NUR
D/C INSTRUCTIONS REVIEWED WITH PT AND SPOUSE. REQUESTED BEING SENT HOME WITH
NAUSEA MEDICATION. CALLED AND SPOKE WITH KAELA FIGUEROA. TED CALLED INTO
[facility] IN [place] PER ORDER TO JOSEPH HEARD TECH. IV D/C
WITH CATHETER TIP INTACT. TELEMETRY REMOVED AND RETURNED TO MONITOR TECH. PT
LEFT VIA WHEELCHAIR TO PERSONAL VEHICLE.

## 2020-09-12 NOTE — MORECARE
CASE MANAGEMENT DISCHARGE SUMMARY
 
 
PATIENT: YVON LOPEZ                   UNIT: [id number]
ACCOUNT#: [id number]                       ADM DATE: 20
AGE: 69     : 50  SEX: M            ROOM/BED: D.2104    
AUTHOR: KARIME DUMONT                             PHYSICIAN:                               
 
REFERRING PHYSICIAN: GHADA ESTRADA MD          
DATE OF SERVICE: 20
Discharge Plan
 
 
Patient Name: YVON LOPEZ
Facility: [facility]:[place]
Encounter #: [id number]
Medical Record #: [id number]
: 1950
Planned Disposition: Home or Self Care
Anticipated Discharge Date: 20
 
Discharge Date: 
Expected LOS: 3
Initial Reviewer: MONTSE
Initial Review Date: 2020
Generated: 20  12:11 pm 
 DCPIA - Discharge Planning Initial Assessment
 
Updated by [id number]: Aditya Sena on 20  11:05 am
*  Is the patient Alert and Oriented?
Yes
*  How many steps to enter\exit or inside your home? 3/0 *  PCP Dr. Adonay Gleason * 
Pharmacy
[facility] in [place]
*  Preadmission Environment
Home with Family
*  ADLs
Independent
*  Equipment
Walker
*  List name and contact numbers for known caregivers / representatives who 
currently or will assist patient after discharge:
Emy Lopez, [phone]
*  Verbal permission to speak to the caregivers and representatives has been 
obtained from the patient.
Yes
*  Community resources currently utilized
None
*  Please name any agencies selected above.
n/a
 
*  Additional services required to return to the preadmission environment?
No
*  Can the patient safely return to the preadmission environment?
Yes
*  Has this patient been hospitalized within the prior 30 days at any 
hospital?
No
 
 
 
 
 
 
 
Last DP export: 20  10:05 a
Patient Name: YVON LOPEZ
Encounter #: [id number]
Page [id number]
 
 
 
 
 
Electronically Signed by KARIME DUMONT on 20 at 1112
 
 
 
 
 
 
**All edits/amendments must be made on the electronic document**
 
DICTATION DATE: 20 1111     : LESLIE  20 1111     
RPT#: [id number]                                DC DATE:        
                                               STATUS: ADM IN  
[facility]
[address] [place] [address]
***END OF REPORT***

## 2020-09-12 NOTE — NUR
PT LAYING SUPINE, RR EVEN AND UNLABORED. DENIES NEEDS OR PAIN AT THIS TIME.
CALL LIGHT WITHIN REACH. BED IN LOWEST POSITION. WILL CONTINUDE TO MONITOR.

## 2020-09-14 NOTE — MORECARE
CASE MANAGEMENT DISCHARGE SUMMARY
 
 
PATIENT: YVON LOPEZ                   UNIT: [id number]
ACCOUNT#: [id number]                       ADM DATE: 20
AGE: 69     : 50  SEX: M            ROOM/BED: D.2104    
AUTHOR: JULIA,DOC                             PHYSICIAN:                               
 
REFERRING PHYSICIAN: GHADA ESTRADA MD          
DATE OF SERVICE: 20
Discharge Plan
 
 
Patient Name: YVON LOPEZ
Facility: [facility]:[place]
Encounter #: [id number]
Medical Record #: [id number]
: 1950
Planned Disposition: Home or Self Care
Anticipated Discharge Date: 20
 
Discharge Date: 2020
Expected LOS: 3
Initial Reviewer: MONTSE
Initial Review Date: 2020
Generated: 20   1:30 pm 
Comments
 
DCP- Discharge Planning
 
Updated by MONTSE: Aditya Sena on 20  10:17 am CT
Patient Name: YVON LOPEZ                                     
Admission Status: ER   
Accout number: [id number]                              
Admission Date: 2020   
: 1950                                                        
Admission Diagnosis:NAUSEA WITH VOMITING, UNSPECIFIED   
Attending: ALBERTO                                                
Current LOS:  3   
  
Anticipated DC Date: 2020   
Planned Disposition: Home or Self Care   
Primary Insurance: [facility]   
  
  
Discharge Planning Comments:   
CM met with patient to complete initial dc planning assessment.  CM educated 
patient on the CM role and verbal consent given by patient to complete 
assessment.  CM verified patient's address, phone number, and emergency 
contact phone numbers.  Patient lives at  home with spouse, Emy Lopez.  
 
At discharge patient plans to return  home and feels this is a safe discharge.
  CM discussed availability of home health, rehab services, and medical 
equipment. Patient declines HH services at this time as well as DME, SNF, and 
IPR.  Informed patient that HH can be set up through his PCP if he later 
decides that he needs the service. Transportation provider at discharge will 
be his Wife, Emy Lopez [phone]. CM will continue to follow and will 
assist as needed with dc plans/needs.  
  
: Aditya Sena
 DCPIA - Discharge Planning Initial Assessment
 
Updated by [id number]: Aditya Sena on 20  11:05 am
*  Is the patient Alert and Oriented?
Yes
*  How many steps to enter\exit or inside your home? 3/0 *  PCP Dr. Adonay Gleason * 
Pharmacy
[facility] in [place]
*  Preadmission Environment
Home with Family
*  ADLs
Independent
*  Equipment
Walker
*  List name and contact numbers for known caregivers / representatives who 
currently or will assist patient after discharge:
Emy Lopez, [phone]
*  Verbal permission to speak to the caregivers and representatives has been 
obtained from the patient.
Yes
*  Community resources currently utilized
None
*  Please name any agencies selected above.
n/a
*  Additional services required to return to the preadmission environment?
No
*  Can the patient safely return to the preadmission environment?
Yes
*  Has this patient been hospitalized within the prior 30 days at any 
hospital?
No
 
 
 
 
 
Coverage Notice
 
Reviewer: [id number] Robert Sena
 
Notice Issued Date-Time: 2020  10:55
Notice Type: IM Discharge Notice
 
 
Notice Delivered To: Patient
Relationship to Patient: 
Representative Name: 
 
Delivery Method: HAND - Hand Delivered
Sherrill Days:
Prior Verbal Notification: 
 
Recipient Understood Notice: Yes
Recipient Signature: Yes
Med Rec Note Co-signed by Attending:
 
Coverage Notice Comment:  IMM DC notice explained, understood, signed, copied 
and given to patient as well as placed on chart.
 
Last DP export: 20  10:19 a
Patient Name: YVON LOPEZ
Encounter #: [id number]
Page [phone]
 
 
 
 
 
Electronically Signed by KARIME DUMONT on 20 at 1230
 
 
 
 
 
 
**All edits/amendments must be made on the electronic document**
 
DICTATION DATE: 20 1230     : LESLIE  20 1230     
RPT#: [id number]                                DC DATE:20
                                               STATUS: DIS IN  
[facility]
[address] [place] [address]
***END OF REPORT***

## 2020-09-24 ENCOUNTER — HOSPITAL ENCOUNTER (OUTPATIENT)
Dept: HOSPITAL 84 - D.US | Age: 70
Discharge: HOME | End: 2020-09-24
Attending: INTERNAL MEDICINE
Payer: COMMERCIAL

## 2020-09-24 VITALS — BODY MASS INDEX: 27.6 KG/M2

## 2020-09-24 DIAGNOSIS — C90.00: Primary | ICD-10-CM

## 2020-09-25 ENCOUNTER — HOSPITAL ENCOUNTER (OUTPATIENT)
Dept: HOSPITAL 84 - D.OPS | Age: 70
Discharge: HOME | End: 2020-09-25
Attending: INTERNAL MEDICINE
Payer: COMMERCIAL

## 2020-09-25 VITALS — BODY MASS INDEX: 25.08 KG/M2 | WEIGHT: 169.35 LBS | BODY MASS INDEX: 25.08 KG/M2 | HEIGHT: 69 IN

## 2020-09-25 VITALS — DIASTOLIC BLOOD PRESSURE: 67 MMHG | SYSTOLIC BLOOD PRESSURE: 107 MMHG

## 2020-09-25 DIAGNOSIS — R11.0: ICD-10-CM

## 2020-09-25 DIAGNOSIS — R10.13: Primary | ICD-10-CM

## 2020-09-25 DIAGNOSIS — K29.70: ICD-10-CM

## 2020-09-25 DIAGNOSIS — K21.0: ICD-10-CM

## 2020-09-25 DIAGNOSIS — R63.4: ICD-10-CM

## 2020-09-25 DIAGNOSIS — K44.9: ICD-10-CM

## 2020-09-25 LAB
ERYTHROCYTE [DISTWIDTH] IN BLOOD BY AUTOMATED COUNT: 20 % (ref 11.5–14.5)
HCT VFR BLD CALC: 41.3 % (ref 42–54)
HGB BLD-MCNC: 14.1 G/DL (ref 13.5–17.5)
MCH RBC QN AUTO: 30.7 PG (ref 26–34)
MCHC RBC AUTO-ENTMCNC: 34.1 G/DL (ref 31–37)
MCV RBC: 90 FL (ref 80–100)
PLATELET # BLD: 289 10X3/UL (ref 130–400)
PMV BLD AUTO: 9 FL (ref 7.4–10.4)
RBC # BLD AUTO: 4.59 10X6/UL (ref 4.2–6.1)
WBC # BLD AUTO: 15.1 10X3/UL (ref 4.8–10.8)

## 2020-09-25 NOTE — NUR
IV D/C'D WITH CANNULA INTACT, PRESSURE HELD AND DRSG PLACED.
DISCHARGE INSTRUCTIONS GIVEN TO PT AND DAUGHTER, BOTH VERBALIZED AN
UNDERSTANDING. TESTS SCHEDULED AS PER ORDERS AND ORDERS FAXED TO DR. MORGAN'S OFFICE. PT DISCHAERGED IN STABLE CONDITION AND W/O C/O

## 2020-09-26 NOTE — OP
PATIENT NAME:  YVON LOPEZ                         MEDICAL RECORD: [id number]
:[birth year]                                             LOCATION:D.OPS          
                                                         ADMISSION DATE:        
SURGEON:  BRAN MORGAN DO             
 
 
DATE OF OPERATION:  2020
 
PROCEDURE:  EGD with biopsies and hemostasis.
 
INDICATIONS FOR PROCEDURE:  Heartburn, epigastric pain, nausea, abnormal weight
loss.
 
SCOPE:  Olympus video gastroscope.
 
MEDICATIONS:  Propofol 140 mg IV and Derek-Synephrine 400 mcg IV per anesthesia.
 
ESTIMATED BLOOD LOSS:  Minimal.
 
COMPLICATIONS:  None.
 
FINDINGS:  Informed consent was given.  The patient was made comfortable with
the above medication.  After reaching an adequate level of sedation by slow IV
push, the patient was placed on his left side.  The endoscope was advanced under
direct visualization through the mouth to the second portion of the duodenum
with ease.  The esophagus appeared normal down to the GE junction.  At the GE
junction, there was evidence of LA class A reflux-induced esophagitis.  The
endoscope was advanced beyond the GE junction into the stomach and retroflexed
to view the cardia, where there was a very small sliding hiatal hernia.  The
mucosa throughout the entire stomach had a congested and erythematous
appearance.  It was very friable.  There was oozing of blood from the mucosal
surface when contacted by the endoscope where the water jet.  Gold probe
coagulation was performed in the stomach body for hemostasis of bleeding.  Cold
forceps biopsies were taken from the antrum to submit for histopathology and to
rule out the presence of H. pylori.  The endoscope was advanced into the
duodenum, which appeared normal to the second portion.  A cold forceps biopsy
was taken to submit for histopathology.  The endoscope was withdrawn from the
patient.  The patient tolerated the procedure well and there were no
complications.
 
IMPRESSION:
1.  LA class A reflux-induced esophagitis.
2.  Gastritis.
3.  Small sliding hiatal hernia.
 
PLAN AND RECOMMENDATIONS:
1.  Discharge home when recovery parameters are met.
2.  Follow up biopsy specimen results.
3.  GERD diet and reflux precautions.
4.  Small frequent meals.
5.  Continue pantoprazole and Carafate as prescribed.
6.  Gastric emptying scan to rule out gastroparesis.
7.  Right upper quadrant ultrasound to evaluate gallbladder.
8.  Consider PIPIDA scan if ultrasound and gastric emptying scan are normal to
rule out gallbladder dyskinesia.
9.  Follow up in GI clinic in 4-6 weeks.
 
TRANSINT:[id number] Voice Confirmation ID: [id number] DOCUMENT ID: [id number]
 
 
 
OPERATIVE REPORT                               [id number]    YVON LOPEZ NATHAN A DO             
 
 
 
Electronically Signed by BRAN ADAN on 20 at 1757
 
 
 
 
 
 
 
 
 
 
 
 
 
 
 
 
 
 
 
 
 
 
 
 
 
 
 
 
 
 
 
 
 
 
 
 
 
 
 
 
 
CC:                                                             [id number]
DICTATION DATE: 20 1345     :     20 0128      [facility] 
                                                                      20
[id number]

## 2020-09-28 ENCOUNTER — HOSPITAL ENCOUNTER (OUTPATIENT)
Dept: HOSPITAL 84 - D.US | Age: 70
Discharge: HOME | End: 2020-09-28
Attending: INTERNAL MEDICINE
Payer: COMMERCIAL

## 2020-09-28 VITALS — BODY MASS INDEX: 25 KG/M2

## 2020-09-28 DIAGNOSIS — R11.0: Primary | ICD-10-CM

## 2020-09-28 DIAGNOSIS — R10.9: ICD-10-CM

## 2020-09-28 DIAGNOSIS — R63.4: ICD-10-CM

## 2020-10-05 ENCOUNTER — HOSPITAL ENCOUNTER (OUTPATIENT)
Dept: HOSPITAL 84 - D.NM | Age: 70
Discharge: HOME | End: 2020-10-05
Attending: INTERNAL MEDICINE
Payer: COMMERCIAL

## 2020-10-05 VITALS — BODY MASS INDEX: 25 KG/M2

## 2020-10-05 DIAGNOSIS — R63.4: ICD-10-CM

## 2020-10-05 DIAGNOSIS — R11.0: ICD-10-CM

## 2020-10-05 DIAGNOSIS — R10.9: Primary | ICD-10-CM

## 2020-10-10 ENCOUNTER — HOSPITAL ENCOUNTER (INPATIENT)
Dept: HOSPITAL 84 - D.ER | Age: 70
LOS: 6 days | Discharge: HOME HEALTH | DRG: 871 | End: 2020-10-16
Attending: INTERNAL MEDICINE | Admitting: INTERNAL MEDICINE
Payer: COMMERCIAL

## 2020-10-10 VITALS — DIASTOLIC BLOOD PRESSURE: 56 MMHG | SYSTOLIC BLOOD PRESSURE: 101 MMHG

## 2020-10-10 VITALS — DIASTOLIC BLOOD PRESSURE: 46 MMHG | SYSTOLIC BLOOD PRESSURE: 75 MMHG

## 2020-10-10 VITALS — DIASTOLIC BLOOD PRESSURE: 68 MMHG | SYSTOLIC BLOOD PRESSURE: 113 MMHG

## 2020-10-10 VITALS — SYSTOLIC BLOOD PRESSURE: 75 MMHG | DIASTOLIC BLOOD PRESSURE: 46 MMHG

## 2020-10-10 VITALS — DIASTOLIC BLOOD PRESSURE: 685 MMHG | SYSTOLIC BLOOD PRESSURE: 110 MMHG

## 2020-10-10 VITALS — BODY MASS INDEX: 29.38 KG/M2 | BODY MASS INDEX: 29.38 KG/M2 | HEIGHT: 69 IN | WEIGHT: 198.4 LBS

## 2020-10-10 VITALS — SYSTOLIC BLOOD PRESSURE: 92 MMHG | DIASTOLIC BLOOD PRESSURE: 59 MMHG

## 2020-10-10 VITALS — SYSTOLIC BLOOD PRESSURE: 102 MMHG | DIASTOLIC BLOOD PRESSURE: 63 MMHG

## 2020-10-10 VITALS — DIASTOLIC BLOOD PRESSURE: 55 MMHG | SYSTOLIC BLOOD PRESSURE: 87 MMHG

## 2020-10-10 VITALS — DIASTOLIC BLOOD PRESSURE: 46 MMHG | SYSTOLIC BLOOD PRESSURE: 79 MMHG

## 2020-10-10 VITALS — DIASTOLIC BLOOD PRESSURE: 54 MMHG | SYSTOLIC BLOOD PRESSURE: 86 MMHG

## 2020-10-10 VITALS — DIASTOLIC BLOOD PRESSURE: 56 MMHG | SYSTOLIC BLOOD PRESSURE: 104 MMHG

## 2020-10-10 VITALS — DIASTOLIC BLOOD PRESSURE: 57 MMHG | SYSTOLIC BLOOD PRESSURE: 91 MMHG

## 2020-10-10 VITALS — SYSTOLIC BLOOD PRESSURE: 90 MMHG | DIASTOLIC BLOOD PRESSURE: 59 MMHG

## 2020-10-10 VITALS — DIASTOLIC BLOOD PRESSURE: 48 MMHG | SYSTOLIC BLOOD PRESSURE: 83 MMHG

## 2020-10-10 VITALS — DIASTOLIC BLOOD PRESSURE: 58 MMHG | SYSTOLIC BLOOD PRESSURE: 104 MMHG

## 2020-10-10 DIAGNOSIS — I95.89: ICD-10-CM

## 2020-10-10 DIAGNOSIS — F32.9: ICD-10-CM

## 2020-10-10 DIAGNOSIS — J18.9: ICD-10-CM

## 2020-10-10 DIAGNOSIS — M35.3: ICD-10-CM

## 2020-10-10 DIAGNOSIS — R77.8: ICD-10-CM

## 2020-10-10 DIAGNOSIS — A41.9: Primary | ICD-10-CM

## 2020-10-10 DIAGNOSIS — E85.9: ICD-10-CM

## 2020-10-10 DIAGNOSIS — E85.3: ICD-10-CM

## 2020-10-10 DIAGNOSIS — I12.9: ICD-10-CM

## 2020-10-10 DIAGNOSIS — L03.115: ICD-10-CM

## 2020-10-10 DIAGNOSIS — D64.9: ICD-10-CM

## 2020-10-10 DIAGNOSIS — I24.8: ICD-10-CM

## 2020-10-10 DIAGNOSIS — N17.9: ICD-10-CM

## 2020-10-10 DIAGNOSIS — R65.21: ICD-10-CM

## 2020-10-10 DIAGNOSIS — J90: ICD-10-CM

## 2020-10-10 DIAGNOSIS — Z85.46: ICD-10-CM

## 2020-10-10 DIAGNOSIS — K21.9: ICD-10-CM

## 2020-10-10 DIAGNOSIS — N04.9: ICD-10-CM

## 2020-10-10 DIAGNOSIS — N18.32: ICD-10-CM

## 2020-10-10 DIAGNOSIS — E88.09: ICD-10-CM

## 2020-10-10 LAB
ALBUMIN SERPL-MCNC: 0.6 G/DL (ref 3.4–5)
ALP SERPL-CCNC: 92 U/L (ref 30–120)
ALT SERPL-CCNC: 19 U/L (ref 10–68)
ANION GAP SERPL CALC-SCNC: 11.5 MMOL/L (ref 8–16)
ANION GAP SERPL CALC-SCNC: 12.4 MMOL/L (ref 8–16)
APTT BLD: 35.3 SECONDS (ref 22.8–39.4)
BILIRUB SERPL-MCNC: 0.25 MG/DL (ref 0.2–1.3)
BUN SERPL-MCNC: 40 MG/DL (ref 7–18)
BUN SERPL-MCNC: 41 MG/DL (ref 7–18)
CALCIUM SERPL-MCNC: 6.5 MG/DL (ref 8.5–10.1)
CALCIUM SERPL-MCNC: 7 MG/DL (ref 8.5–10.1)
CHLORIDE SERPL-SCNC: 107 MMOL/L (ref 98–107)
CHLORIDE SERPL-SCNC: 108 MMOL/L (ref 98–107)
CK MB SERPL-MCNC: 3.3 U/L (ref 0–3.6)
CK MB SERPL-MCNC: 3.4 U/L (ref 0–3.6)
CK SERPL-CCNC: 64 UL (ref 21–232)
CK SERPL-CCNC: 87 UL (ref 21–232)
CO2 SERPL-SCNC: 20.4 MMOL/L (ref 21–32)
CO2 SERPL-SCNC: 22.7 MMOL/L (ref 21–32)
CREAT SERPL-MCNC: 4.2 MG/DL (ref 0.6–1.3)
CREAT SERPL-MCNC: 4.5 MG/DL (ref 0.6–1.3)
ERYTHROCYTE [DISTWIDTH] IN BLOOD BY AUTOMATED COUNT: 20.5 % (ref 11.5–14.5)
ERYTHROCYTE [DISTWIDTH] IN BLOOD BY AUTOMATED COUNT: 20.9 % (ref 11.5–14.5)
GLOBULIN SER-MCNC: 2.2 G/L
GLUCOSE SERPL-MCNC: 101 MG/DL (ref 74–106)
GLUCOSE SERPL-MCNC: 77 MG/DL (ref 74–106)
HCT VFR BLD CALC: 33 % (ref 42–54)
HCT VFR BLD CALC: 36.7 % (ref 42–54)
HGB BLD-MCNC: 11.3 G/DL (ref 13.5–17.5)
HGB BLD-MCNC: 12.2 G/DL (ref 13.5–17.5)
INR PPP: 1.01 (ref 0.85–1.17)
INR PPP: 1.08 (ref 0.85–1.17)
LYMPHOCYTES NFR BLD AUTO: 12 % (ref 15–50)
LYMPHOCYTES NFR BLD AUTO: 9.3 % (ref 15–50)
MAGNESIUM SERPL-MCNC: 1.8 MG/DL (ref 1.8–2.4)
MCH RBC QN AUTO: 30.4 PG (ref 26–34)
MCH RBC QN AUTO: 31 PG (ref 26–34)
MCHC RBC AUTO-ENTMCNC: 33.2 G/DL (ref 31–37)
MCHC RBC AUTO-ENTMCNC: 34.2 G/DL (ref 31–37)
MCV RBC: 90.7 FL (ref 80–100)
MCV RBC: 91.5 FL (ref 80–100)
NEUTROPHILS NFR BLD AUTO: 86.8 % (ref 40–80)
NEUTROPHILS NFR BLD AUTO: 88.3 % (ref 40–80)
NT-PROBNP SERPL-MCNC: (no result) PG/ML (ref 0–125)
OSMOLALITY SERPL CALC.SUM OF ELEC: 282 MOSM/KG (ref 275–300)
OSMOLALITY SERPL CALC.SUM OF ELEC: 283 MOSM/KG (ref 275–300)
PHOSPHATE SERPL-MCNC: 6.5 MG/DL (ref 2.5–4.9)
PLATELET # BLD: 228 10X3/UL (ref 130–400)
PLATELET # BLD: 241 10X3/UL (ref 130–400)
PMV BLD AUTO: 10.2 FL (ref 7.4–10.4)
PMV BLD AUTO: 9.9 FL (ref 7.4–10.4)
POTASSIUM SERPL-SCNC: 3.8 MMOL/L (ref 3.5–5.1)
POTASSIUM SERPL-SCNC: 4.2 MMOL/L (ref 3.5–5.1)
PROT SERPL-MCNC: 2.8 G/DL (ref 6.4–8.2)
PROTHROMBIN TIME: 13.3 SECONDS (ref 11.6–15)
PROTHROMBIN TIME: 13.9 SECONDS (ref 11.6–15)
RBC # BLD AUTO: 3.64 10X6/UL (ref 4.2–6.1)
RBC # BLD AUTO: 4.01 10X6/UL (ref 4.2–6.1)
SODIUM SERPL-SCNC: 137 MMOL/L (ref 136–145)
SODIUM SERPL-SCNC: 137 MMOL/L (ref 136–145)
TROPONIN I SERPL-MCNC: 0.24 NG/ML (ref 0–0.06)
TROPONIN I SERPL-MCNC: 0.46 NG/ML (ref 0–0.06)
WBC # BLD AUTO: 8 10X3/UL (ref 4.8–10.8)
WBC # BLD AUTO: 9.3 10X3/UL (ref 4.8–10.8)

## 2020-10-11 VITALS — DIASTOLIC BLOOD PRESSURE: 69 MMHG | SYSTOLIC BLOOD PRESSURE: 108 MMHG

## 2020-10-11 VITALS — DIASTOLIC BLOOD PRESSURE: 85 MMHG | SYSTOLIC BLOOD PRESSURE: 141 MMHG

## 2020-10-11 VITALS — SYSTOLIC BLOOD PRESSURE: 112 MMHG | DIASTOLIC BLOOD PRESSURE: 77 MMHG

## 2020-10-11 VITALS — DIASTOLIC BLOOD PRESSURE: 82 MMHG | SYSTOLIC BLOOD PRESSURE: 126 MMHG

## 2020-10-11 VITALS — DIASTOLIC BLOOD PRESSURE: 71 MMHG | SYSTOLIC BLOOD PRESSURE: 115 MMHG

## 2020-10-11 VITALS — SYSTOLIC BLOOD PRESSURE: 104 MMHG | DIASTOLIC BLOOD PRESSURE: 59 MMHG

## 2020-10-11 VITALS — DIASTOLIC BLOOD PRESSURE: 64 MMHG | SYSTOLIC BLOOD PRESSURE: 107 MMHG

## 2020-10-11 VITALS — SYSTOLIC BLOOD PRESSURE: 113 MMHG | DIASTOLIC BLOOD PRESSURE: 70 MMHG

## 2020-10-11 VITALS — DIASTOLIC BLOOD PRESSURE: 82 MMHG | SYSTOLIC BLOOD PRESSURE: 130 MMHG

## 2020-10-11 VITALS — SYSTOLIC BLOOD PRESSURE: 125 MMHG | DIASTOLIC BLOOD PRESSURE: 77 MMHG

## 2020-10-11 VITALS — SYSTOLIC BLOOD PRESSURE: 100 MMHG | DIASTOLIC BLOOD PRESSURE: 57 MMHG

## 2020-10-11 VITALS — SYSTOLIC BLOOD PRESSURE: 115 MMHG | DIASTOLIC BLOOD PRESSURE: 75 MMHG

## 2020-10-11 VITALS — SYSTOLIC BLOOD PRESSURE: 130 MMHG | DIASTOLIC BLOOD PRESSURE: 64 MMHG

## 2020-10-11 VITALS — DIASTOLIC BLOOD PRESSURE: 83 MMHG | SYSTOLIC BLOOD PRESSURE: 125 MMHG

## 2020-10-11 VITALS — DIASTOLIC BLOOD PRESSURE: 68 MMHG | SYSTOLIC BLOOD PRESSURE: 144 MMHG

## 2020-10-11 VITALS — DIASTOLIC BLOOD PRESSURE: 69 MMHG | SYSTOLIC BLOOD PRESSURE: 101 MMHG

## 2020-10-11 VITALS — SYSTOLIC BLOOD PRESSURE: 91 MMHG | DIASTOLIC BLOOD PRESSURE: 62 MMHG

## 2020-10-11 VITALS — DIASTOLIC BLOOD PRESSURE: 80 MMHG | SYSTOLIC BLOOD PRESSURE: 112 MMHG

## 2020-10-11 VITALS — SYSTOLIC BLOOD PRESSURE: 147 MMHG | DIASTOLIC BLOOD PRESSURE: 61 MMHG

## 2020-10-11 VITALS — SYSTOLIC BLOOD PRESSURE: 118 MMHG | DIASTOLIC BLOOD PRESSURE: 81 MMHG

## 2020-10-11 VITALS — DIASTOLIC BLOOD PRESSURE: 56 MMHG | SYSTOLIC BLOOD PRESSURE: 104 MMHG

## 2020-10-11 VITALS — DIASTOLIC BLOOD PRESSURE: 74 MMHG | SYSTOLIC BLOOD PRESSURE: 121 MMHG

## 2020-10-11 VITALS — SYSTOLIC BLOOD PRESSURE: 127 MMHG | DIASTOLIC BLOOD PRESSURE: 77 MMHG

## 2020-10-11 VITALS — DIASTOLIC BLOOD PRESSURE: 80 MMHG | SYSTOLIC BLOOD PRESSURE: 99 MMHG

## 2020-10-11 VITALS — SYSTOLIC BLOOD PRESSURE: 108 MMHG | DIASTOLIC BLOOD PRESSURE: 66 MMHG

## 2020-10-11 VITALS — SYSTOLIC BLOOD PRESSURE: 126 MMHG | DIASTOLIC BLOOD PRESSURE: 75 MMHG

## 2020-10-11 VITALS — DIASTOLIC BLOOD PRESSURE: 82 MMHG | SYSTOLIC BLOOD PRESSURE: 132 MMHG

## 2020-10-11 VITALS — DIASTOLIC BLOOD PRESSURE: 75 MMHG | SYSTOLIC BLOOD PRESSURE: 108 MMHG

## 2020-10-11 VITALS — DIASTOLIC BLOOD PRESSURE: 65 MMHG | SYSTOLIC BLOOD PRESSURE: 134 MMHG

## 2020-10-11 VITALS — SYSTOLIC BLOOD PRESSURE: 117 MMHG | DIASTOLIC BLOOD PRESSURE: 73 MMHG

## 2020-10-11 VITALS — DIASTOLIC BLOOD PRESSURE: 73 MMHG | SYSTOLIC BLOOD PRESSURE: 115 MMHG

## 2020-10-11 VITALS — DIASTOLIC BLOOD PRESSURE: 81 MMHG | SYSTOLIC BLOOD PRESSURE: 126 MMHG

## 2020-10-11 VITALS — DIASTOLIC BLOOD PRESSURE: 61 MMHG | SYSTOLIC BLOOD PRESSURE: 102 MMHG

## 2020-10-11 VITALS — DIASTOLIC BLOOD PRESSURE: 86 MMHG | SYSTOLIC BLOOD PRESSURE: 142 MMHG

## 2020-10-11 VITALS — SYSTOLIC BLOOD PRESSURE: 137 MMHG | DIASTOLIC BLOOD PRESSURE: 71 MMHG

## 2020-10-11 VITALS — DIASTOLIC BLOOD PRESSURE: 78 MMHG | SYSTOLIC BLOOD PRESSURE: 121 MMHG

## 2020-10-11 VITALS — DIASTOLIC BLOOD PRESSURE: 65 MMHG | SYSTOLIC BLOOD PRESSURE: 147 MMHG

## 2020-10-11 VITALS — DIASTOLIC BLOOD PRESSURE: 79 MMHG | SYSTOLIC BLOOD PRESSURE: 123 MMHG

## 2020-10-11 VITALS — SYSTOLIC BLOOD PRESSURE: 114 MMHG | DIASTOLIC BLOOD PRESSURE: 76 MMHG

## 2020-10-11 VITALS — DIASTOLIC BLOOD PRESSURE: 80 MMHG | SYSTOLIC BLOOD PRESSURE: 125 MMHG

## 2020-10-11 VITALS — DIASTOLIC BLOOD PRESSURE: 80 MMHG | SYSTOLIC BLOOD PRESSURE: 120 MMHG

## 2020-10-11 LAB
ALBUMIN SERPL-MCNC: 1 G/DL (ref 3.4–5)
ALP SERPL-CCNC: 103 U/L (ref 30–120)
ALT SERPL-CCNC: 21 U/L (ref 10–68)
ANION GAP SERPL CALC-SCNC: 14.1 MMOL/L (ref 8–16)
APTT BLD: 34.2 SECONDS (ref 22.8–39.4)
BASOPHILS NFR BLD AUTO: 0.1 % (ref 0–2)
BILIRUB SERPL-MCNC: 0.54 MG/DL (ref 0.2–1.3)
BUN SERPL-MCNC: 43 MG/DL (ref 7–18)
CALCIUM SERPL-MCNC: 7 MG/DL (ref 8.5–10.1)
CHLORIDE SERPL-SCNC: 107 MMOL/L (ref 98–107)
CK MB SERPL-MCNC: 2.5 U/L (ref 0–3.6)
CK MB SERPL-MCNC: 3.3 U/L (ref 0–3.6)
CK SERPL-CCNC: 66 UL (ref 21–232)
CK SERPL-CCNC: 78 UL (ref 21–232)
CO2 SERPL-SCNC: 22 MMOL/L (ref 21–32)
CREAT SERPL-MCNC: 4.6 MG/DL (ref 0.6–1.3)
EOSINOPHIL NFR BLD: 0 % (ref 0–7)
ERYTHROCYTE [DISTWIDTH] IN BLOOD BY AUTOMATED COUNT: 19.3 % (ref 11.5–14.5)
FERRITIN SERPL-MCNC: 629 NG/ML (ref 3–244)
GLOBULIN SER-MCNC: 2.6 G/L
GLUCOSE SERPL-MCNC: 107 MG/DL (ref 74–106)
HCT VFR BLD CALC: 36.4 % (ref 42–54)
HGB BLD-MCNC: 12.5 G/DL (ref 13.5–17.5)
IMM GRANULOCYTES NFR BLD: 0.1 % (ref 0–5)
INR PPP: 1.11 (ref 0.85–1.17)
IRON SERPL-MCNC: 12 UG/DL (ref 35–150)
LYMPHOCYTES NFR BLD AUTO: 7.7 % (ref 15–50)
MAGNESIUM SERPL-MCNC: 1.8 MG/DL (ref 1.8–2.4)
MCH RBC QN AUTO: 30.4 PG (ref 26–34)
MCHC RBC AUTO-ENTMCNC: 34.3 G/DL (ref 31–37)
MCV RBC: 88.6 FL (ref 80–100)
MONOCYTES NFR BLD: 1.4 % (ref 2–11)
NEUTROPHILS NFR BLD AUTO: 90.7 % (ref 40–80)
OSMOLALITY SERPL CALC.SUM OF ELEC: 288 MOSM/KG (ref 275–300)
PLATELET # BLD: 226 10X3/UL (ref 130–400)
PMV BLD AUTO: 10 FL (ref 7.4–10.4)
POTASSIUM SERPL-SCNC: 4.1 MMOL/L (ref 3.5–5.1)
PROT SERPL-MCNC: 3.6 G/DL (ref 6.4–8.2)
PROTHROMBIN TIME: 14.2 SECONDS (ref 11.6–15)
RBC # BLD AUTO: 4.11 10X6/UL (ref 4.2–6.1)
SAO2 % BLD FROM PO2: 15 % (ref 15–55)
SODIUM SERPL-SCNC: 139 MMOL/L (ref 136–145)
TIBC SERPL-MCNC: 78 UG/DL (ref 260–445)
TROPONIN I SERPL-MCNC: 0.62 NG/ML (ref 0–0.06)
TROPONIN I SERPL-MCNC: 0.71 NG/ML (ref 0–0.06)
UIBC SERPL-MCNC: 66 UG/DL (ref 150–375)
WBC # BLD AUTO: 9.9 10X3/UL (ref 4.8–10.8)

## 2020-10-11 NOTE — NUR
REPORT RECEIVED. PT SITTING UP IN BED, AAOX4. NO ACUTE DISTRESS NOTED. PIV IN
LT AND RT FOREARM INFUSING, SEE IV FLOWSHEET. ASSESSMENT COMPLETED, SEE
FLOWSHEET. WILL CONTINUE TO MONITOR.

## 2020-10-11 NOTE — NUR
0930 TRANSPORTED TO CT SCAN VIA BED..
0945 BACK IN ICU FROM CT SCAN .. YI WELL
1100 LAB IN TO DRAW RANDOM VANC LEVEL PT C/O PAIN IN LEG
1125 TYLENOL GIVEN FOR C/O PAIN..

## 2020-10-11 NOTE — NUR
0700 REPORT RECIEVED AND CARE ASSUMED OF PATIENT.. SEE FLOW SHEET FOR SHIFT
ASSESMENT FINDINGS.. PT IS AWAKE AT THIS TIME LEVOPHED FOR BP AT 10 MCG..
BILAT PIV.. LEFT LEG WITH REDNESS AND SWELLING PT C/O PAIN IN LEG AND
DIFFICULTY MOVING IT.. PULSES WITH DOPPLER..

## 2020-10-12 VITALS — SYSTOLIC BLOOD PRESSURE: 132 MMHG | DIASTOLIC BLOOD PRESSURE: 71 MMHG

## 2020-10-12 VITALS — DIASTOLIC BLOOD PRESSURE: 78 MMHG | SYSTOLIC BLOOD PRESSURE: 135 MMHG

## 2020-10-12 VITALS — DIASTOLIC BLOOD PRESSURE: 72 MMHG | SYSTOLIC BLOOD PRESSURE: 124 MMHG

## 2020-10-12 VITALS — SYSTOLIC BLOOD PRESSURE: 102 MMHG | DIASTOLIC BLOOD PRESSURE: 74 MMHG

## 2020-10-12 VITALS — DIASTOLIC BLOOD PRESSURE: 87 MMHG | SYSTOLIC BLOOD PRESSURE: 141 MMHG

## 2020-10-12 VITALS — SYSTOLIC BLOOD PRESSURE: 131 MMHG | DIASTOLIC BLOOD PRESSURE: 86 MMHG

## 2020-10-12 VITALS — SYSTOLIC BLOOD PRESSURE: 140 MMHG | DIASTOLIC BLOOD PRESSURE: 82 MMHG

## 2020-10-12 VITALS — DIASTOLIC BLOOD PRESSURE: 86 MMHG | SYSTOLIC BLOOD PRESSURE: 147 MMHG

## 2020-10-12 VITALS — DIASTOLIC BLOOD PRESSURE: 64 MMHG | SYSTOLIC BLOOD PRESSURE: 125 MMHG

## 2020-10-12 VITALS — DIASTOLIC BLOOD PRESSURE: 21 MMHG | SYSTOLIC BLOOD PRESSURE: 84 MMHG

## 2020-10-12 VITALS — DIASTOLIC BLOOD PRESSURE: 89 MMHG | SYSTOLIC BLOOD PRESSURE: 141 MMHG

## 2020-10-12 VITALS — SYSTOLIC BLOOD PRESSURE: 116 MMHG | DIASTOLIC BLOOD PRESSURE: 79 MMHG

## 2020-10-12 VITALS — SYSTOLIC BLOOD PRESSURE: 117 MMHG | DIASTOLIC BLOOD PRESSURE: 75 MMHG

## 2020-10-12 VITALS — DIASTOLIC BLOOD PRESSURE: 77 MMHG | SYSTOLIC BLOOD PRESSURE: 115 MMHG

## 2020-10-12 VITALS — SYSTOLIC BLOOD PRESSURE: 107 MMHG | DIASTOLIC BLOOD PRESSURE: 84 MMHG

## 2020-10-12 VITALS — SYSTOLIC BLOOD PRESSURE: 144 MMHG | DIASTOLIC BLOOD PRESSURE: 79 MMHG

## 2020-10-12 VITALS — DIASTOLIC BLOOD PRESSURE: 62 MMHG | SYSTOLIC BLOOD PRESSURE: 112 MMHG

## 2020-10-12 VITALS — SYSTOLIC BLOOD PRESSURE: 102 MMHG | DIASTOLIC BLOOD PRESSURE: 65 MMHG

## 2020-10-12 VITALS — DIASTOLIC BLOOD PRESSURE: 70 MMHG | SYSTOLIC BLOOD PRESSURE: 105 MMHG

## 2020-10-12 VITALS — DIASTOLIC BLOOD PRESSURE: 78 MMHG | SYSTOLIC BLOOD PRESSURE: 124 MMHG

## 2020-10-12 VITALS — SYSTOLIC BLOOD PRESSURE: 98 MMHG | DIASTOLIC BLOOD PRESSURE: 64 MMHG

## 2020-10-12 VITALS — SYSTOLIC BLOOD PRESSURE: 116 MMHG | DIASTOLIC BLOOD PRESSURE: 74 MMHG

## 2020-10-12 VITALS — SYSTOLIC BLOOD PRESSURE: 139 MMHG | DIASTOLIC BLOOD PRESSURE: 84 MMHG

## 2020-10-12 VITALS — DIASTOLIC BLOOD PRESSURE: 77 MMHG | SYSTOLIC BLOOD PRESSURE: 129 MMHG

## 2020-10-12 VITALS — SYSTOLIC BLOOD PRESSURE: 136 MMHG | DIASTOLIC BLOOD PRESSURE: 90 MMHG

## 2020-10-12 VITALS — DIASTOLIC BLOOD PRESSURE: 76 MMHG | SYSTOLIC BLOOD PRESSURE: 132 MMHG

## 2020-10-12 VITALS — SYSTOLIC BLOOD PRESSURE: 124 MMHG | DIASTOLIC BLOOD PRESSURE: 73 MMHG

## 2020-10-12 VITALS — SYSTOLIC BLOOD PRESSURE: 111 MMHG | DIASTOLIC BLOOD PRESSURE: 80 MMHG

## 2020-10-12 VITALS — SYSTOLIC BLOOD PRESSURE: 138 MMHG | DIASTOLIC BLOOD PRESSURE: 74 MMHG

## 2020-10-12 VITALS — SYSTOLIC BLOOD PRESSURE: 148 MMHG | DIASTOLIC BLOOD PRESSURE: 84 MMHG

## 2020-10-12 VITALS — DIASTOLIC BLOOD PRESSURE: 84 MMHG | SYSTOLIC BLOOD PRESSURE: 145 MMHG

## 2020-10-12 VITALS — DIASTOLIC BLOOD PRESSURE: 81 MMHG | SYSTOLIC BLOOD PRESSURE: 108 MMHG

## 2020-10-12 VITALS — SYSTOLIC BLOOD PRESSURE: 113 MMHG | DIASTOLIC BLOOD PRESSURE: 69 MMHG

## 2020-10-12 VITALS — DIASTOLIC BLOOD PRESSURE: 73 MMHG | SYSTOLIC BLOOD PRESSURE: 110 MMHG

## 2020-10-12 VITALS — DIASTOLIC BLOOD PRESSURE: 85 MMHG | SYSTOLIC BLOOD PRESSURE: 135 MMHG

## 2020-10-12 VITALS — DIASTOLIC BLOOD PRESSURE: 77 MMHG | SYSTOLIC BLOOD PRESSURE: 110 MMHG

## 2020-10-12 VITALS — SYSTOLIC BLOOD PRESSURE: 97 MMHG | DIASTOLIC BLOOD PRESSURE: 66 MMHG

## 2020-10-12 VITALS — SYSTOLIC BLOOD PRESSURE: 118 MMHG | DIASTOLIC BLOOD PRESSURE: 69 MMHG

## 2020-10-12 VITALS — SYSTOLIC BLOOD PRESSURE: 135 MMHG | DIASTOLIC BLOOD PRESSURE: 81 MMHG

## 2020-10-12 VITALS — SYSTOLIC BLOOD PRESSURE: 128 MMHG | DIASTOLIC BLOOD PRESSURE: 72 MMHG

## 2020-10-12 VITALS — SYSTOLIC BLOOD PRESSURE: 137 MMHG | DIASTOLIC BLOOD PRESSURE: 76 MMHG

## 2020-10-12 VITALS — DIASTOLIC BLOOD PRESSURE: 84 MMHG | SYSTOLIC BLOOD PRESSURE: 137 MMHG

## 2020-10-12 VITALS — DIASTOLIC BLOOD PRESSURE: 68 MMHG | SYSTOLIC BLOOD PRESSURE: 114 MMHG

## 2020-10-12 VITALS — SYSTOLIC BLOOD PRESSURE: 132 MMHG | DIASTOLIC BLOOD PRESSURE: 78 MMHG

## 2020-10-12 VITALS — DIASTOLIC BLOOD PRESSURE: 64 MMHG | SYSTOLIC BLOOD PRESSURE: 115 MMHG

## 2020-10-12 VITALS — SYSTOLIC BLOOD PRESSURE: 103 MMHG | DIASTOLIC BLOOD PRESSURE: 60 MMHG

## 2020-10-12 VITALS — DIASTOLIC BLOOD PRESSURE: 74 MMHG | SYSTOLIC BLOOD PRESSURE: 112 MMHG

## 2020-10-12 VITALS — SYSTOLIC BLOOD PRESSURE: 106 MMHG | DIASTOLIC BLOOD PRESSURE: 64 MMHG

## 2020-10-12 VITALS — DIASTOLIC BLOOD PRESSURE: 79 MMHG | SYSTOLIC BLOOD PRESSURE: 131 MMHG

## 2020-10-12 VITALS — SYSTOLIC BLOOD PRESSURE: 88 MMHG | DIASTOLIC BLOOD PRESSURE: 78 MMHG

## 2020-10-12 VITALS — SYSTOLIC BLOOD PRESSURE: 133 MMHG | DIASTOLIC BLOOD PRESSURE: 80 MMHG

## 2020-10-12 VITALS — SYSTOLIC BLOOD PRESSURE: 137 MMHG | DIASTOLIC BLOOD PRESSURE: 80 MMHG

## 2020-10-12 VITALS — DIASTOLIC BLOOD PRESSURE: 78 MMHG | SYSTOLIC BLOOD PRESSURE: 132 MMHG

## 2020-10-12 VITALS — DIASTOLIC BLOOD PRESSURE: 81 MMHG | SYSTOLIC BLOOD PRESSURE: 111 MMHG

## 2020-10-12 VITALS — DIASTOLIC BLOOD PRESSURE: 84 MMHG | SYSTOLIC BLOOD PRESSURE: 146 MMHG

## 2020-10-12 LAB
ANION GAP SERPL CALC-SCNC: 12.5 MMOL/L (ref 8–16)
BACTERIA #/AREA URNS HPF: (no result) HPF
BASOPHILS NFR BLD AUTO: 0.4 % (ref 0–2)
BILIRUB SERPL-MCNC: NEGATIVE MG/DL
BUN SERPL-MCNC: 42 MG/DL (ref 7–18)
CALCIUM SERPL-MCNC: 7.3 MG/DL (ref 8.5–10.1)
CHLORIDE SERPL-SCNC: 108 MMOL/L (ref 98–107)
CO2 SERPL-SCNC: 21.8 MMOL/L (ref 21–32)
CREAT SERPL-MCNC: 4.8 MG/DL (ref 0.6–1.3)
CREAT SERPL-MCNC: 4.8 MG/DL (ref 0.6–1.3)
EOSINOPHIL NFR BLD: 0.9 % (ref 0–7)
ERYTHROCYTE [DISTWIDTH] IN BLOOD BY AUTOMATED COUNT: 18.9 % (ref 11.5–14.5)
GLUCOSE SERPL-MCNC: 82 MG/DL (ref 74–106)
HCT VFR BLD CALC: 30.7 % (ref 42–54)
HGB BLD-MCNC: 10.5 G/DL (ref 13.5–17.5)
IMM GRANULOCYTES NFR BLD: 0.2 % (ref 0–5)
KETONES UR STRIP-MCNC: NEGATIVE MG/DL
LYMPHOCYTES NFR BLD AUTO: 6.9 % (ref 15–50)
MCH RBC QN AUTO: 30.3 PG (ref 26–34)
MCHC RBC AUTO-ENTMCNC: 34.2 G/DL (ref 31–37)
MCV RBC: 88.5 FL (ref 80–100)
MONOCYTES NFR BLD: 8.1 % (ref 2–11)
NEUTROPHILS NFR BLD AUTO: 83.5 % (ref 40–80)
NITRITE UR-MCNC: NEGATIVE MG/ML
OSMOLALITY SERPL CALC.SUM OF ELEC: 287 MOSM/KG (ref 275–300)
PH UR STRIP: 6 [PH] (ref 5–8)
PLATELET # BLD: 215 10X3/UL (ref 130–400)
PMV BLD AUTO: 10.5 FL (ref 7.4–10.4)
POTASSIUM SERPL-SCNC: 3.3 MMOL/L (ref 3.5–5.1)
RBC # BLD AUTO: 3.47 10X6/UL (ref 4.2–6.1)
SODIUM SERPL-SCNC: 139 MMOL/L (ref 136–145)
UROBILINOGEN UR-MCNC: NORMAL MG/DL (ref ?–2)
VANCOMYCIN SERPL-MCNC: 20.3 UG/ML (ref 10–20)
WBC # BLD AUTO: 10.2 10X3/UL (ref 4.8–10.8)
WBC #/AREA URNS HPF: (no result) HPF (ref 0–1)

## 2020-10-12 NOTE — NUR
REPORT RECIEVED FROM OFF GOING NURSE AND PATIENT CARE ASSUMED. PATIENT LAYING
IN BED ON BACK WITH EYES CLOSED AND BREATHING EVENLY. IV RT FA INFUSING
LEVOPHED AT 3 MCG LT AC INFUSING NS@ 50. /69 
O2RA 93 RESP 23 T 98.9. WILL CONTINUE WITH PLAN OF CARE. SR UP X 2 BED IN LOW
POSITON AND CALL LIGHT IN REACH.

## 2020-10-12 NOTE — NUR
RESTING WELL AT THIS TIME NO C/O NOTE OR VOICED. WILL CONTINUE WITH CURRENT
POC. C/L IN REACH AT BEDSIDE.

## 2020-10-12 NOTE — NUR
PT ON CALL LIGHT, PT UP TO BEDSIDE COMMODE WITH ASSISTANCE, REPORTS HE NEEDS
TO HAVE A BM, WIPES PROVIDED AND INST TO USE CALL LIGHT WHEN FINISHED, CALL
LIGHT IN REACH

## 2020-10-12 NOTE — NUR
PT AWAKE, ADM 2100 MEDS PER MD ORDERS, SEE EMAR, PT REQUESTED AND SERVED FRESH
H20, DENIES FURTHER NEEDS

## 2020-10-12 NOTE — NUR
PATIENT WIFE OLIVIER CALLED WITH CORRECT SECURITY CODE. GAVE UPDATE AND ANSWERED
QUESTIONS TO SATIFACTION.

## 2020-10-12 NOTE — NUR
SHIFT ASSESSMENT PER FLOW SHEET, VS CONTINUE, IV IN RIGHT FA INTACT WITH NO
REDNESS OR EDEMA INFUSING VIA PUMP LEVOPHED AT 3.8MCG IN LEFT FA AND NS
INFUSING AT 50 ML/HR IN RIGHT FA, PT DENIES PAIN, PT INST TO USE CALL LIGHT
WHEN NEEDING TO USE BEDSIDE COMMODE, PT VERBALIZES UNDERSTANDING, DENIES NEEDS
AT THIS TIME, BED IN LOW POSITION, SIDE RAILS X 2, CALL LIGTH IN REACH

## 2020-10-12 NOTE — NUR
ASST PATIENT UP TO BS COMMODE. PATIENT HAD SM BM. ASST PATIENT BACK TO BED.
PATIENT DYSPENIC WITH EXERTION. O2 RA 96%. GAVE PATIENT MORE APPLE JUICE. WILL
CONTINUE TO MONITOR. SR UP X 2 BED IN LOW POSITION AND CALL LIGHT IN REACH.

## 2020-10-12 NOTE — NUR
PT OFF BEDSIDE COMMODE, VOIDED AND HAD LARGE PASTEY BM, PT CLEANED SELF UP,
BACK TO BED, DENIES FURTHER NEEDS

## 2020-10-13 VITALS — DIASTOLIC BLOOD PRESSURE: 65 MMHG | SYSTOLIC BLOOD PRESSURE: 107 MMHG

## 2020-10-13 VITALS — SYSTOLIC BLOOD PRESSURE: 114 MMHG | DIASTOLIC BLOOD PRESSURE: 74 MMHG

## 2020-10-13 VITALS — SYSTOLIC BLOOD PRESSURE: 110 MMHG | DIASTOLIC BLOOD PRESSURE: 63 MMHG

## 2020-10-13 VITALS — SYSTOLIC BLOOD PRESSURE: 111 MMHG | DIASTOLIC BLOOD PRESSURE: 72 MMHG

## 2020-10-13 VITALS — SYSTOLIC BLOOD PRESSURE: 128 MMHG | DIASTOLIC BLOOD PRESSURE: 75 MMHG

## 2020-10-13 VITALS — SYSTOLIC BLOOD PRESSURE: 125 MMHG | DIASTOLIC BLOOD PRESSURE: 79 MMHG

## 2020-10-13 VITALS — SYSTOLIC BLOOD PRESSURE: 120 MMHG | DIASTOLIC BLOOD PRESSURE: 69 MMHG

## 2020-10-13 VITALS — SYSTOLIC BLOOD PRESSURE: 111 MMHG | DIASTOLIC BLOOD PRESSURE: 71 MMHG

## 2020-10-13 VITALS — DIASTOLIC BLOOD PRESSURE: 65 MMHG | SYSTOLIC BLOOD PRESSURE: 110 MMHG

## 2020-10-13 VITALS — DIASTOLIC BLOOD PRESSURE: 68 MMHG | SYSTOLIC BLOOD PRESSURE: 98 MMHG

## 2020-10-13 VITALS — SYSTOLIC BLOOD PRESSURE: 126 MMHG | DIASTOLIC BLOOD PRESSURE: 74 MMHG

## 2020-10-13 VITALS — SYSTOLIC BLOOD PRESSURE: 115 MMHG | DIASTOLIC BLOOD PRESSURE: 68 MMHG

## 2020-10-13 VITALS — DIASTOLIC BLOOD PRESSURE: 74 MMHG | SYSTOLIC BLOOD PRESSURE: 124 MMHG

## 2020-10-13 VITALS — DIASTOLIC BLOOD PRESSURE: 72 MMHG | SYSTOLIC BLOOD PRESSURE: 122 MMHG

## 2020-10-13 VITALS — DIASTOLIC BLOOD PRESSURE: 73 MMHG | SYSTOLIC BLOOD PRESSURE: 121 MMHG

## 2020-10-13 VITALS — SYSTOLIC BLOOD PRESSURE: 100 MMHG | DIASTOLIC BLOOD PRESSURE: 71 MMHG

## 2020-10-13 LAB
ALBUMIN SERPL-MCNC: 1.6 G/DL (ref 3.4–5)
ALP SERPL-CCNC: 92 U/L (ref 30–120)
ALT SERPL-CCNC: 13 U/L (ref 10–68)
ANION GAP SERPL CALC-SCNC: 17 MMOL/L (ref 8–16)
BASOPHILS NFR BLD AUTO: 0.4 % (ref 0–2)
BILIRUB SERPL-MCNC: 0.76 MG/DL (ref 0.2–1.3)
BUN SERPL-MCNC: 41 MG/DL (ref 7–18)
CALCIUM SERPL-MCNC: 7 MG/DL (ref 8.5–10.1)
CHLORIDE SERPL-SCNC: 108 MMOL/L (ref 98–107)
CO2 SERPL-SCNC: 19.5 MMOL/L (ref 21–32)
CREAT SERPL-MCNC: 4.9 MG/DL (ref 0.6–1.3)
EOSINOPHIL NFR BLD: 1.7 % (ref 0–7)
ERYTHROCYTE [DISTWIDTH] IN BLOOD BY AUTOMATED COUNT: 18.7 % (ref 11.5–14.5)
GLOBULIN SER-MCNC: 2 G/L
GLUCOSE SERPL-MCNC: 64 MG/DL (ref 74–106)
HCT VFR BLD CALC: 30.2 % (ref 42–54)
HGB BLD-MCNC: 10.2 G/DL (ref 13.5–17.5)
IMM GRANULOCYTES NFR BLD: 0.4 % (ref 0–5)
LYMPHOCYTES NFR BLD AUTO: 8.6 % (ref 15–50)
MCH RBC QN AUTO: 30.1 PG (ref 26–34)
MCHC RBC AUTO-ENTMCNC: 33.8 G/DL (ref 31–37)
MCV RBC: 89.1 FL (ref 80–100)
MONOCYTES NFR BLD: 8.4 % (ref 2–11)
NEUTROPHILS NFR BLD AUTO: 80.5 % (ref 40–80)
OSMOLALITY SERPL CALC.SUM OF ELEC: 288 MOSM/KG (ref 275–300)
PLATELET # BLD: 216 10X3/UL (ref 130–400)
PMV BLD AUTO: 10.4 FL (ref 7.4–10.4)
POTASSIUM SERPL-SCNC: 3.5 MMOL/L (ref 3.5–5.1)
PROT SERPL-MCNC: 3.6 G/DL (ref 6.4–8.2)
RBC # BLD AUTO: 3.39 10X6/UL (ref 4.2–6.1)
SODIUM SERPL-SCNC: 141 MMOL/L (ref 136–145)
VANCOMYCIN SERPL-MCNC: 21.8 UG/ML (ref 10–20)
WBC # BLD AUTO: 11.2 10X3/UL (ref 4.8–10.8)

## 2020-10-13 NOTE — NUR
PATIENT ON CL STATED THAT HE HAS SWOLLEN AND PROGRESSIVELY GOTTEN WORSE. MADE
SLING FOR TESTICLES AND PAGED MADHU [place] APN. PER APN, CAN ORDER U/S OF
TESTICLES NAD SLING WILL WORK. VERY COMMON FOR THOSE WITH EDEMA TO SWELL IN
THIS AREA. WILL RELAY MESSAGE TO PT AND SPOUSE

## 2020-10-13 NOTE — NUR
REC'D CHGE. OF SHIFT WALKING ROUNDS.BEDSIDE ULTRA SOUND OF PROSTATE IN
PROGRESS. WIFE STATES HAS NEW BEEN THIS LARGE . REMAINS ELEVATED ON FOLDED
TOWEL TO HELP DECREASE SCROTOL SWELLING.JACKY HINSON IS AWARE OF INCREASE
SWELLING TO SCROTUM. WILL CONTINUE TO MONITOR FOR ANY CHGES AND FOLLOW CURRENT
PLAN OF CARE.

## 2020-10-13 NOTE — NUR
PT ON CALL LIGHT, PT UP ON BEDSIDE COMMODE, PT REPORTS THAT ANOTHER NURSE GOT
HIM UP TO BEDSIDE COMMODE, PT PROVIDED WET WARM WIPES, INST TO USE CALL LIGHT
WHEN FINISHED

## 2020-10-13 NOTE — NUR
PT ON CALL LIGHT, PT FINISHED CLEANING SELF UP, PT BACK TO BED, PT VOIDED
APPROX 350 MLS OF DARK YELLOW URINE AND HAD LARGE BROWN PASTEY BM, PT DENIES
FURTHER NEEDS

## 2020-10-13 NOTE — NUR
PT AWAKE, IV IN LEFT WRIST NO LONGER PATENT, IV D/C'ED, TIP INTACT, PRESSURE
HELD, BANDAID APPLIED, JACEK MAHARAJ RN, CHARGE NURSE STOPPED LEVOPHED AND
STARTED NS IN RIGHT FA INFUSING AT 50ML/HR, ALBUMIN HUNG PER MD ORDERS, SEE
EMAR, PT DENIES NEEDS AT THIS TIME

## 2020-10-13 NOTE — NUR
PT RESTING WITH EYES CLOSED, AROUSES TO SOFT VERBAL STIMULATION, VS CONTINUE,
TEMP OBTAINED, I&O'S COLLECTED, 0500/0600 MEDS ADM PER MD ORDERS, SEE EMAR,
WITH FRESH H20, PT DENIES NEEDS OR PAIN AT THIS TIME

## 2020-10-14 VITALS — DIASTOLIC BLOOD PRESSURE: 90 MMHG | SYSTOLIC BLOOD PRESSURE: 134 MMHG

## 2020-10-14 VITALS — SYSTOLIC BLOOD PRESSURE: 108 MMHG | DIASTOLIC BLOOD PRESSURE: 67 MMHG

## 2020-10-14 VITALS — DIASTOLIC BLOOD PRESSURE: 72 MMHG | SYSTOLIC BLOOD PRESSURE: 118 MMHG

## 2020-10-14 VITALS — SYSTOLIC BLOOD PRESSURE: 107 MMHG | DIASTOLIC BLOOD PRESSURE: 58 MMHG

## 2020-10-14 LAB
ALBUMIN SERPL-MCNC: 1.4 G/DL (ref 3.4–5)
ALP SERPL-CCNC: 92 U/L (ref 30–120)
ALT SERPL-CCNC: 16 U/L (ref 10–68)
ANION GAP SERPL CALC-SCNC: 16.9 MMOL/L (ref 8–16)
APTT BLD: 46.1 SECONDS (ref 22.8–39.4)
BASOPHILS NFR BLD AUTO: 0.4 % (ref 0–2)
BILIRUB SERPL-MCNC: 0.52 MG/DL (ref 0.2–1.3)
BILIRUB SERPL-MCNC: NEGATIVE MG/DL
BUN SERPL-MCNC: 41 MG/DL (ref 7–18)
CALCIUM SERPL-MCNC: 7.2 MG/DL (ref 8.5–10.1)
CHLORIDE SERPL-SCNC: 109 MMOL/L (ref 98–107)
CO2 SERPL-SCNC: 18.2 MMOL/L (ref 21–32)
CREAT SERPL-MCNC: 5.1 MG/DL (ref 0.6–1.3)
CREAT UR-MCNC: 40 MG/DL
CREATININE - URINE: 23.7 MG/DL (ref 30–125)
EOSINOPHIL NFR BLD: 4 % (ref 0–7)
ERYTHROCYTE [DISTWIDTH] IN BLOOD BY AUTOMATED COUNT: 18.9 % (ref 11.5–14.5)
GLOBULIN SER-MCNC: 2.9 G/L
GLUCOSE SERPL-MCNC: 77 MG/DL (ref 74–106)
HCT VFR BLD CALC: 31.3 % (ref 42–54)
HGB BLD-MCNC: 10.9 G/DL (ref 13.5–17.5)
IMM GRANULOCYTES NFR BLD: 0.4 % (ref 0–5)
INR PPP: 0.99 (ref 0.85–1.17)
KETONES UR STRIP-MCNC: NEGATIVE MG/DL
LYMPHOCYTES NFR BLD AUTO: 6 % (ref 15–50)
MCH RBC QN AUTO: 30.4 PG (ref 26–34)
MCHC RBC AUTO-ENTMCNC: 34.8 G/DL (ref 31–37)
MCV RBC: 87.2 FL (ref 80–100)
MICROALBUMIN UR-MCNC: 8256.6 UG/ML
MONOCYTES NFR BLD: 7.9 % (ref 2–11)
NEUTROPHILS NFR BLD AUTO: 81.3 % (ref 40–80)
NITRITE UR-MCNC: NEGATIVE MG/ML
OSMOLALITY SERPL CALC.SUM OF ELEC: 289 MOSM/KG (ref 275–300)
PH UR STRIP: 7 [PH] (ref 5–8)
PLATELET # BLD: 282 10X3/UL (ref 130–400)
PMV BLD AUTO: 10.6 FL (ref 7.4–10.4)
POTASSIUM SERPL-SCNC: 3.1 MMOL/L (ref 3.5–5.1)
PROT SERPL-MCNC: 4.3 G/DL (ref 6.4–8.2)
PROT UR-MCNC: 1146.1 MG/DL (ref 0–11.9)
PROTHROMBIN TIME: 13.1 SECONDS (ref 11.6–15)
RBC # BLD AUTO: 3.59 10X6/UL (ref 4.2–6.1)
SODIUM SERPL-SCNC: 141 MMOL/L (ref 136–145)
UROBILINOGEN UR-MCNC: NORMAL MG/DL (ref ?–2)
WBC # BLD AUTO: 11.3 10X3/UL (ref 4.8–10.8)

## 2020-10-14 PROCEDURE — 0W9B3ZZ DRAINAGE OF LEFT PLEURAL CAVITY, PERCUTANEOUS APPROACH: ICD-10-PCS | Performed by: RADIOLOGY

## 2020-10-14 NOTE — NUR
PER ORDERS, BLADDER SCANED PT AFTER PT HAD URINATED, PT SCAN SHOWED 489 STILL
IN BLADDER, CALLED [facility] AND RELAYED MESSAGE, RECEIVED TELEPHONE ORDER FOR
GORDILLO PLACEMENT. PLACED GORDILLO WITH ASSIST OF KRISTINE NEIL, HAD URINE RETURN
BUT ONLY GOT OUT LESS THAN 100. BLADDER SCANNED PT AGAIN AND SHOWED BLADDER
. FLUSHED GORDILLO AND STILL NO MORE URINE DRAINING. CALLED UNIT MANAGER
WILL CONTINUE WITH MEEK OF CARE

## 2020-10-14 NOTE — NUR
PATIENT K+ IS 3.1 TODAY, ADMINISTERED PRN K+ PER PROTOCOL, REDRAW TIMED FOR
12. NO OTHER NEEDS AT THIS TIME. CONTINUE WITH MEEK OF CARE

## 2020-10-14 NOTE — NUR
REC'D IN BED WATCHING TV WIFE AT BEDSIDE. STATES FEELS MUCH BETTER TODAY AFTER
SLEEPING GOOD LAST NITE.GENERALIZED EDEMA STILL.LEFT ARM SLIGHTLY WEEPING CHUX
PLACED UNDER ARM AND ELEVATED.SCROTUM REMAINS SWOLLEN ELEVATED ON TOWEL. GORDILLO
PATENT AND DRAINING CAITIE COLORED URINE. DENIES ANY DISCOMFORT AT PRESENT TIME
WILL CONTINUE TO MONITOR FOR ANY CHGES. AND FOLLOW CURRENT PLAN OF CARE.

## 2020-10-14 NOTE — MORECARE
CASE MANAGEMENT DISCHARGE SUMMARY
 
 
PATIENT: YVON LOPEZ                   UNIT: [id number]
ACCOUNT#: [id number]                       ADM DATE: 10/10/20
AGE: 69     : 50  SEX: M            ROOM/BED: D.2224    
AUTHOR: JULIA,DOC                             PHYSICIAN:                               
 
REFERRING PHYSICIAN: SIRI WEBER MD             
DATE OF SERVICE: 10/14/20
Discharge Plan
 
 
Patient Name: YVON LOPEZ
Facility: [facility]:[place]
Encounter #: [id number]
Medical Record #: [id number]
: 1950
Planned Disposition: Home
Anticipated Discharge Date: 
 
Discharge Date: 
Expected LOS: 
Initial Reviewer: [id number]
Initial Review Date: 10/10/2020
Generated: 10/14/20   8:10 pm 
Comments
 
DCP- Discharge Planning
 
Updated by [id number]: Melanie Bird on 10/14/20   6:05 pm CT
LATE ENTRY  10/13/20   
  
Patient Name: YVON LOPEZ                                     
Admission Status: ER   
Accout number: [id number]                              
Admission Date: 10-   
: 1950                                                        
Admission Diagnosis:SEPSIS, UNSPECIFIED ORGANISM   
Attending: Siri Weber                                                
Current LOS:  4   
  
Anticipated DC Date:    
Planned Disposition: Home   
Primary Insurance: [employer]   
  
  
Discharge Planning Comments: CM met with patient to complete initial dc 
planning assessment.  CM educated patient on the CM role and verbal consent 
given by patient to complete assessment. Patient lives at home with family. 
 
Patient is independent. At discharge patient plans to return home and feels 
this is a safe discharge.  CM discussed availability of home health, rehab 
services, and medical equipment. Patient will have family to transport home. 
Patient denied known discharge needs at this time. CM will continue to follow 
and will assist as needed with dc plans/needs  
  
  
  
  
  
  
: Melanie Bird
 DCPIA - Discharge Planning Initial Assessment
 
Updated by [id number]: Melanie Bird on 10/14/20   6:59 pm
*  Is the patient Alert and Oriented?
Yes
*  How many steps to enter\exit or inside your home?
 
*  PCP
Sukhwinder Wagner
*  Ph[facility]acy
[facility]
*  Preadmission Environment
Home with Family
*  ADLs
Independent
*  Equipment
None
*  List name and contact numbers for known caregivers / representatives who 
currently or will assist patient after discharge:
Emy Lopez - spouse - [phone]
*  Verbal permission to speak to the caregivers and representatives has been 
obtained from the patient.
Yes
*  Community resources currently utilized
None
*  Additional services required to return to the preadmission environment?
No
*  Can the patient safely return to the preadmission environment?
Yes
*  Has this patient been hospitalized within the prior 30 days at any 
hospital?
Yes
 
 
 
 
 
 
 
Last DP export: 10/14/20   6:04 
 
Patient Name: YVON LOPEZ
Encounter #: [id number]
Page [id number]
 
 
 
 
 
Electronically Signed by KARIME DUMONT on 10/14/20 at 1910
 
 
 
 
 
 
**All edits/amendments must be made on the electronic document**
 
DICTATION DATE: 10/14/20 1910     : LESLIE  10/14/20 1910     
RPT#: [id number]                                DC DATE:        
                                               STATUS: ADM IN  
[facility]
 [place] [address]
***END OF REPORT***

## 2020-10-14 NOTE — MORECARE
CASE MANAGEMENT DISCHARGE SUMMARY
 
 
PATIENT: YVON LOPEZ                   UNIT: [id number]
ACCOUNT#: [id number]                       ADM DATE: 10/10/20
AGE: 69     : 50  SEX: M            ROOM/BED: D.2224    
AUTHOR: KARIME DUMONT                             PHYSICIAN:                               
 
REFERRING PHYSICIAN: PHANI WEBER MD             
DATE OF SERVICE: 10/14/20
Discharge Plan
 
 
Patient Name: YVON LOPEZ
Facility: [facility]:[place]
Encounter #: [id number]
Medical Record #: [id number]
: 1950
Planned Disposition: Home
Anticipated Discharge Date: 
 
Discharge Date: 
Expected LOS: 
Initial Reviewer: [id number]
Initial Review Date: 10/10/2020
Generated: 10/14/20   7:56 pm 
  
 
 
 
 
 
 
 
Patient Name: YVON LOPEZ
 
Encounter #: [id number]
Page [phone]
 
 
 
 
 
Electronically Signed by KARIME DUMONT on 10/14/20 at 1857
 
 
 
 
 
 
**All edits/amendments must be made on the electronic document**
 
DICTATION DATE: 10/14/20 1856     : LESLIE  10/14/20 1856     
RPT#: [id number]                                DC DATE:        
                                               STATUS: ADM IN  
[facility]
[address] [place] [address]
***END OF REPORT***

## 2020-10-14 NOTE — MORECARE
CASE MANAGEMENT DISCHARGE SUMMARY
 
 
PATIENT: YVON LOPEZ                   UNIT: [id number]
ACCOUNT#: [id number]                       ADM DATE: 10/10/20
AGE: 69     : 50  SEX: M            ROOM/BED: D.2224    
AUTHOR: KARIME DUMONT                             PHYSICIAN:                               
 
REFERRING PHYSICIAN: PHANI WEBER MD             
DATE OF SERVICE: 10/14/20
Discharge Plan
 
 
Patient Name: YVON LOPEZ
Facility: [facility]:[place]
Encounter #: [id number]
Medical Record #: [id number]
: 1950
Planned Disposition: Home
Anticipated Discharge Date: 
 
Discharge Date: 
Expected LOS: 
Initial Reviewer: [email or web address]
Initial Review Date: 10/10/2020
Generated: 10/14/20   8:03 pm 
 DCPIA - Discharge Planning Initial Assessment
 
Updated by [id number]: Melanie Bird on 10/14/20   6:59 pm
*  Is the patient Alert and Oriented?
Yes
*  How many steps to enter\exit or inside your home?
 
*  PCP
Sukhwinder Wagner
*  Pharmacy
[facility]
*  Preadmission Environment
Home with Family
*  ADLs
Independent
*  Equipment
None
*  List name and contact numbers for known caregivers / representatives who 
currently or will assist patient after discharge:
Emy Lopez - spouse - [phone]
*  Verbal permission to speak to the caregivers and representatives has been 
obtained from the patient.
Yes
*  Community resources currently utilized
 
None
*  Additional services required to return to the preadmission environment?
No
*  Can the patient safely return to the preadmission environment?
Yes
*  Has this patient been hospitalized within the prior 30 days at any 
hospital?
Yes
 
 
 
 
 
 
 
Last DP export: 10/14/20   5:57 
Patient Name: YVON LOPEZ
Encounter #: [id number]
Page [id number]
 
 
 
 
 
Electronically Signed by KARIME DUMONT on 10/14/20 at 1904
 
 
 
 
 
 
**All edits/amendments must be made on the electronic document**
 
DICTATION DATE: 10/14/20 1903     : LESLIE  10/14/20 1903     
RPT#: [id number]                                DC DATE:        
                                               STATUS: ADM IN  
[facility]
 [place] [address]
***END OF REPORT***

## 2020-10-15 VITALS — DIASTOLIC BLOOD PRESSURE: 60 MMHG | SYSTOLIC BLOOD PRESSURE: 107 MMHG

## 2020-10-15 VITALS — SYSTOLIC BLOOD PRESSURE: 182 MMHG | DIASTOLIC BLOOD PRESSURE: 44 MMHG

## 2020-10-15 VITALS — SYSTOLIC BLOOD PRESSURE: 108 MMHG | DIASTOLIC BLOOD PRESSURE: 60 MMHG

## 2020-10-15 VITALS — DIASTOLIC BLOOD PRESSURE: 48 MMHG | SYSTOLIC BLOOD PRESSURE: 92 MMHG

## 2020-10-15 VITALS — DIASTOLIC BLOOD PRESSURE: 63 MMHG | SYSTOLIC BLOOD PRESSURE: 103 MMHG

## 2020-10-15 LAB
ALBUMIN SERPL-MCNC: 1.7 G/DL (ref 3.4–5)
ALP SERPL-CCNC: 87 U/L (ref 30–120)
ALT SERPL-CCNC: 17 U/L (ref 10–68)
ANION GAP SERPL CALC-SCNC: 14.6 MMOL/L (ref 8–16)
BASOPHILS NFR BLD AUTO: 0.9 % (ref 0–2)
BILIRUB SERPL-MCNC: 0.45 MG/DL (ref 0.2–1.3)
BUN SERPL-MCNC: 41 MG/DL (ref 7–18)
CALCIUM SERPL-MCNC: 7.1 MG/DL (ref 8.5–10.1)
CHLORIDE SERPL-SCNC: 111 MMOL/L (ref 98–107)
CO2 SERPL-SCNC: 18.8 MMOL/L (ref 21–32)
CREAT SERPL-MCNC: 5.5 MG/DL (ref 0.6–1.3)
EOSINOPHIL NFR BLD: 4.6 % (ref 0–7)
ERYTHROCYTE [DISTWIDTH] IN BLOOD BY AUTOMATED COUNT: 18.8 % (ref 11.5–14.5)
GLOBULIN SER-MCNC: 2.6 G/L
GLUCOSE SERPL-MCNC: 83 MG/DL (ref 74–106)
HCT VFR BLD CALC: 29.1 % (ref 42–54)
HGB BLD-MCNC: 9.8 G/DL (ref 13.5–17.5)
IMM GRANULOCYTES NFR BLD: 0.4 % (ref 0–5)
LYMPHOCYTES NFR BLD AUTO: 8.7 % (ref 15–50)
MCH RBC QN AUTO: 29.8 PG (ref 26–34)
MCHC RBC AUTO-ENTMCNC: 33.7 G/DL (ref 31–37)
MCV RBC: 88.4 FL (ref 80–100)
MONOCYTES NFR BLD: 9 % (ref 2–11)
NEUTROPHILS NFR BLD AUTO: 76.4 % (ref 40–80)
OSMOLALITY SERPL CALC.SUM OF ELEC: 289 MOSM/KG (ref 275–300)
PLATELET # BLD: 231 10X3/UL (ref 130–400)
PMV BLD AUTO: 10 FL (ref 7.4–10.4)
POTASSIUM SERPL-SCNC: 3.4 MMOL/L (ref 3.5–5.1)
PROT SERPL-MCNC: 4.3 G/DL (ref 6.4–8.2)
RBC # BLD AUTO: 3.29 10X6/UL (ref 4.2–6.1)
SODIUM SERPL-SCNC: 141 MMOL/L (ref 136–145)
WBC # BLD AUTO: 9.3 10X3/UL (ref 4.8–10.8)

## 2020-10-15 NOTE — NUR
PATIENT RESTING IN BED WITH WIFE AT BEDSIDE. NO S/S OF ACUTE DISTRESS. NO C/O
AT THIS TIME. PATIENT HAS A RIGHT FOREARM, SALINE LOC. PATIENT USES URINAL AND
UP WITH ASSIST TO THE BATHROOM. CALL LIGHT WITHIN REACH. WILL CONTIUE TO
MONITOR.

## 2020-10-15 NOTE — NUR
Nutrition follow-up:
Pt reports feeling better today; appetite is poor to fair
Pt receiving a renal diet.
PO Intake is fair at this time
Labs reviewed
Wt: 198#
RDN following.

## 2020-10-15 NOTE — MORECARE
CASE MANAGEMENT DISCHARGE SUMMARY
 
 
PATIENT: YVON LOPEZ                   UNIT: [id number]
ACCOUNT#: [id number]                       ADM DATE: 10/10/20
AGE: 69     : 50  SEX: M            ROOM/BED: D.2224    
AUTHOR: JULIA,DOC                             PHYSICIAN:                               
 
REFERRING PHYSICIAN: SIRI WEBER MD             
DATE OF SERVICE: 10/15/20
Discharge Plan
 
 
Patient Name: YVON LOPEZ
Facility: [facility]:[place]
Encounter #: [id number]
Medical Record #: [id number]
: 1950
Planned Disposition: Home
Anticipated Discharge Date: 
 
Discharge Date: 
Expected LOS: 
Initial Reviewer: [id number]
Initial Review Date: 10/10/2020
Generated: 10/15/20   4:46 pm 
Comments
 
DCP- Discharge Planning
 
Updated by [id number]: Melanie Bird on 10/14/20   6:05 pm CT
LATE ENTRY  10/13/20   
  
Patient Name: YVON LOPEZ                                     
Admission Status: ER   
Accout number: [id number]                              
Admission Date: 10-   
: 1950                                                        
Admission Diagnosis:SEPSIS, UNSPECIFIED ORGANISM   
Attending: Siri Weber                                                
Current LOS:  4   
  
Anticipated DC Date:    
Planned Disposition: Home   
Primary Insurance: [employer]   
  
  
Discharge Planning Comments: CM met with patient to complete initial dc 
planning assessment.  CM educated patient on the CM role and verbal consent 
given by patient to complete assessment. Patient lives at home with family. 
 
Patient is independent. At discharge patient plans to return home and feels 
this is a safe discharge.  CM discussed availability of home health, rehab 
services, and medical equipment. Patient will have family to transport home. 
Patient denied known discharge needs at this time. CM will continue to follow 
and will assist as needed with dc plans/needs  
  
  
  
  
  
  
: Melanie Bird
 DCPIA - Discharge Planning Initial Assessment
 
Updated by [id number]: Melanie Bird on 10/14/20   6:59 pm
*  Is the patient Alert and Oriented?
Yes
*  How many steps to enter\exit or inside your home?
 
*  PCP
Sukhwinder Wagner
*  Pharmacy
[facility]
*  Preadmission Environment
Home with Family
*  ADLs
Independent
*  Equipment
None
*  List name and contact numbers for known caregivers / representatives who 
currently or will assist patient after discharge:
Emy Lopez - spouse - [phone]
*  Verbal permission to speak to the caregivers and representatives has been 
obtained from the patient.
Yes
*  Community resources currently utilized
None
*  Additional services required to return to the preadmission environment?
No
*  Can the patient safely return to the preadmission environment?
Yes
*  Has this patient been hospitalized within the prior 30 days at any 
hospital?
Yes
 
 
 
 
 
 
 
Last DP export: 10/14/20   6:10 
 
Patient Name: YVON LOPEZ
Encounter #: [id number]
Page [id number]
 
 
 
 
 
Electronically Signed by KARIME DUMONT on 10/15/20 at 1547
 
 
 
 
 
 
**All edits/amendments must be made on the electronic document**
 
DICTATION DATE: 10/15/20 1547     : LESLIE  10/15/20 1547     
RPT#: [id number]                                [place] DATE:        
                                               STATUS: ADM IN  
[facility]
 [place] [address]
***END OF REPORT***

## 2020-10-16 VITALS — DIASTOLIC BLOOD PRESSURE: 64 MMHG | SYSTOLIC BLOOD PRESSURE: 141 MMHG

## 2020-10-16 VITALS — SYSTOLIC BLOOD PRESSURE: 113 MMHG | DIASTOLIC BLOOD PRESSURE: 68 MMHG

## 2020-10-16 LAB
ALBUMIN SERPL-MCNC: 1.4 G/DL (ref 3.4–5)
ALP SERPL-CCNC: 100 U/L (ref 30–120)
ALT SERPL-CCNC: 22 U/L (ref 10–68)
ANION GAP SERPL CALC-SCNC: 17.8 MMOL/L (ref 8–16)
BASOPHILS NFR BLD AUTO: 1.5 % (ref 0–2)
BILIRUB SERPL-MCNC: 0.31 MG/DL (ref 0.2–1.3)
BUN SERPL-MCNC: 42 MG/DL (ref 7–18)
CALCIUM SERPL-MCNC: 7 MG/DL (ref 8.5–10.1)
CHLORIDE SERPL-SCNC: 111 MMOL/L (ref 98–107)
CO2 SERPL-SCNC: 17.8 MMOL/L (ref 21–32)
CREAT SERPL-MCNC: 6.3 MG/DL (ref 0.6–1.3)
EOSINOPHIL NFR BLD: 7.5 % (ref 0–7)
ERYTHROCYTE [DISTWIDTH] IN BLOOD BY AUTOMATED COUNT: 18.8 % (ref 11.5–14.5)
GLOBULIN SER-MCNC: 2.8 G/L
GLUCOSE SERPL-MCNC: 82 MG/DL (ref 74–106)
HCT VFR BLD CALC: 30.5 % (ref 42–54)
HGB BLD-MCNC: 10.3 G/DL (ref 13.5–17.5)
IMM GRANULOCYTES NFR BLD: 0.7 % (ref 0–5)
LYMPHOCYTES NFR BLD AUTO: 8.4 % (ref 15–50)
MCH RBC QN AUTO: 29.9 PG (ref 26–34)
MCHC RBC AUTO-ENTMCNC: 33.8 G/DL (ref 31–37)
MCV RBC: 88.4 FL (ref 80–100)
MONOCYTES NFR BLD: 9.9 % (ref 2–11)
NEUTROPHILS NFR BLD AUTO: 72 % (ref 40–80)
OSMOLALITY SERPL CALC.SUM OF ELEC: 294 MOSM/KG (ref 275–300)
PLATELET # BLD: 230 10X3/UL (ref 130–400)
PMV BLD AUTO: 9.5 FL (ref 7.4–10.4)
POTASSIUM SERPL-SCNC: 3.6 MMOL/L (ref 3.5–5.1)
PROT SERPL-MCNC: 4.2 G/DL (ref 6.4–8.2)
RBC # BLD AUTO: 3.45 10X6/UL (ref 4.2–6.1)
SODIUM SERPL-SCNC: 143 MMOL/L (ref 136–145)
WBC # BLD AUTO: 9.4 10X3/UL (ref 4.8–10.8)

## 2020-10-16 NOTE — MORECARE
CASE MANAGEMENT DISCHARGE SUMMARY
 
 
PATIENT: YVON LOPEZ                   UNIT: [id number]
ACCOUNT#: [id number]                       ADM DATE: 10/10/20
AGE: 69     : 50  SEX: M            ROOM/BED: D.2224    
AUTHOR: JULIA,DOC                             PHYSICIAN:                               
 
REFERRING PHYSICIAN: SIRI WEBER MD             
DATE OF SERVICE: 10/16/20
Discharge Plan
 
 
Patient Name: YVON LOPEZ
Facility: [facility]:[place]
Encounter #: [id number]
Medical Record #: [id number]
: 1950
Planned Disposition: Home
Anticipated Discharge Date: 
 
Discharge Date: 
Expected LOS: 
Initial Reviewer: [email or web address]
Initial Review Date: 10/10/2020
Generated: 10/16/20   3:13 pm 
Comments
 
DCP- Discharge Planning
 
Updated by [id number]: Shilpa Taylor on 10/16/20   1:08 pm CT
Patient Name: YVON LOPEZ                                     
Admission Status: ER   
Accout number: [id number]                              
Admission Date: 10-   
: 1950                                                        
Admission Diagnosis:SEPSIS, UNSPECIFIED ORGANISM   
Attending: Siri Weber                                                
Current LOS:  6   
  
Anticipated DC Date:    
Planned Disposition: Home   
Primary Insurance: [employer]   
  
  
Discharge Planning Comments: PATIENT WILL NEED HOME HEALTH FOR GORDILLO CARE. 
ZUHAIR SIGNED FOR [facility] . FAXED REFERRAL. IMM SIGNED. CM TO FOLLOW AND ASSIST. 
  
  
  
 
  
  
  
  
: Shilpa Taylor
 
DCP- Discharge Planning
 
Updated by [id number]: Melanie Bird on 10/14/20   6:05 pm CT
LATE ENTRY  10/13/20   
  
Patient Name: YVON LOPEZ                                     
Admission Status: ER   
Accout number: [id number]                              
Admission Date: 10-   
: 1950                                                        
Admission Diagnosis:SEPSIS, UNSPECIFIED ORGANISM   
Attending: Siri Weber                                                
Current LOS:  4   
  
Anticipated DC Date:    
Planned Disposition: Home   
Primary Insurance: [employer]   
  
  
Discharge Planning Comments: CM met with patient to complete initial dc 
planning assessment.  CM educated patient on the CM role and verbal consent 
given by patient to complete assessment. Patient lives at home with family. 
Patient is independent. At discharge patient plans to return home and feels 
this is a safe discharge.  CM discussed availability of home health, rehab 
services, and medical equipment. Patient will have family to transport home. 
Patient denied known discharge needs at this time. CM will continue to follow 
and will assist as needed with dc plans/needs  
  
  
  
  
  
  
: Melanie Bird
 DCPIA - Discharge Planning Initial Assessment
 
Updated by [id number]: Melanie Bird on 10/14/20   6:59 pm
*  Is the patient Alert and Oriented?
Yes
*  How many steps to enter\exit or inside your home?
 
*  PCP
Sukhwinder Wagner
*  Pharmacy
[facility]
*  Preadmission Environment
 
Home with Family
*  ADLs
Independent
*  Equipment
None
*  List name and contact numbers for known caregivers / representatives who 
currently or will assist patient after discharge:
Emy Lopez - spouse - [phone]
*  Verbal permission to speak to the caregivers and representatives has been 
obtained from the patient.
Yes
*  Community resources currently utilized
None
*  Additional services required to return to the preadmission environment?
No
*  Can the patient safely return to the preadmission environment?
Yes
*  Has this patient been hospitalized within the prior 30 days at any 
hospital?
Yes
 
 
 
 
 
Coverage Notice
 
Reviewer: [email or web address] Robert Taylor
 
Notice Issued Date-Time: 10/16/2020  14:04
Notice Type: IM Discharge Notice
 
Notice Delivered To: 
Relationship to Patient: 
Representative Name: 
 
Delivery Method:  - 
Sherrill Days:
Prior Verbal Notification: 
 
Recipient Understood Notice: 
Recipient Signature: 
Med Rec Note Co-signed by Attending:
 
Coverage Notice Comment:  
Reviewer: [id number] Taylor
 
Notice Issued Date-Time: 10/16/2020  14:04
Notice Type: Patient Choice Letter
 
Notice Delivered To: 
Relationship to Patient: 
 
Representative Name: 
 
Delivery Method:  - 
Sherrill Days:
Prior Verbal Notification: 
 
Recipient Understood Notice: Yes
Recipient Signature: Yes
Med Rec Note Co-signed by Attending:
 
Coverage Notice Comment:  [facility]
 
Last DP export: 10/16/20   1:06 
Patient Name: YVON LOPEZ
Encounter #: [id number]
Page [id number]
 
 
 
 
 
Electronically Signed by KARIME DUMONT on 10/16/20 at 1414
 
 
 
 
 
 
**All edits/amendments must be made on the electronic document**
 
DICTATION DATE: 10/16/20 1413     : LESLIE  10/16/20 1413     
RPT#: [id number]                                DC DATE:        
                                               STATUS: ADM IN  
[facility]
[address] [place] [address]
***END OF REPORT***

## 2020-10-16 NOTE — NUR
PT DC HOME, WENT OVER DC PAPERWORK AS WELL AS FOLLOW UP APPOINTMENTS. ALL
QUESTIONS ANSWERED. NO OTHER NEEDS VOICED, IV DC WITH CATHETER INTACT

## 2020-10-16 NOTE — MORECARE
CASE MANAGEMENT DISCHARGE SUMMARY
 
 
PATIENT: YVON LOPEZ                   UNIT: [id number]
ACCOUNT#: [id number]                       ADM DATE: 10/10/20
AGE: 69     : 50  SEX: M            ROOM/BED: D.2224    
AUTHOR: JULIA,DOC                             PHYSICIAN:                               
 
REFERRING PHYSICIAN: SIRI WEBER MD             
DATE OF SERVICE: 10/16/20
Discharge Plan
 
 
Patient Name: YVON LOPEZ
Facility: [facility]:[place]
Encounter #: [id number]
Medical Record #: [id number]
: 1950
Planned Disposition: Home
Anticipated Discharge Date: 
 
Discharge Date: 
Expected LOS: 
Initial Reviewer: [id number]
Initial Review Date: 10/10/2020
Generated: 10/16/20   3:05 pm 
Comments
 
DCP- Discharge Planning
 
Updated by [id number]: Melanie Bird on 10/14/20   6:05 pm CT
LATE ENTRY  10/13/20   
  
Patient Name: YVON LOPEZ                                     
Admission Status: ER   
Accout number: [id number]                              
Admission Date: 10-   
: 1950                                                        
Admission Diagnosis:SEPSIS, UNSPECIFIED ORGANISM   
Attending: Siri Weber                                                
Current LOS:  4   
  
Anticipated DC Date:    
Planned Disposition: Home   
Primary Insurance: [employer]   
  
  
Discharge Planning Comments: CM met with patient to complete initial dc 
planning assessment.  CM educated patient on the CM role and verbal consent 
given by patient to complete assessment. Patient lives at home with family. 
 
Patient is independent. At discharge patient plans to return home and feels 
this is a safe discharge.  CM discussed availability of home health, rehab 
services, and medical equipment. Patient will have family to transport home. 
Patient denied known discharge needs at this time. CM will continue to follow 
and will assist as needed with dc plans/needs  
  
  
  
  
  
  
: Melanie Bird
 DCPIA - Discharge Planning Initial Assessment
 
Updated by [id number]: Melanie Bird on 10/14/20   6:59 pm
*  Is the patient Alert and Oriented?
Yes
*  How many steps to enter\exit or inside your home?
 
*  PCP
Sukhwinder Wagner
*  Pharmacy
[facility]
*  Preadmission Environment
Home with Family
*  ADLs
Independent
*  Equipment
None
*  List name and contact numbers for known caregivers / representatives who 
currently or will assist patient after discharge:
Emy Lopez [facility] - [phone]
*  Verbal permission to speak to the caregivers and representatives has been 
obtained from the patient.
Yes
*  Community resources currently utilized
None
*  Additional services required to return to the preadmission environment?
No
*  Can the patient safely return to the preadmission environment?
Yes
*  Has this patient been hospitalized within the prior 30 days at any 
hospital?
Yes
 
External Providers
External Provider: [facility]
 
Next Contact Date: 
Service Request Date: 
Service Type: 
Resolution: 
 
 
Reviewer: 
Comments: 
 
 
 
 
Coverage Notice
 
Reviewer: [email or web address] Taylor
 
Notice Issued Date-Time: 10/16/2020  14:04
Notice Type: IM Discharge Notice
 
Notice Delivered To: 
Relationship to Patient: 
Representative Name: 
 
Delivery Method:  - 
Sherrill Days:
Prior Verbal Notification: 
 
Recipient Understood Notice: 
Recipient Signature: 
Med Rec Note Co-signed by Attending:
 
Coverage Notice Comment:  
Reviewer: [id number] Taylor
 
Notice Issued Date-Time: 10/16/2020  14:04
Notice Type: Patient Choice Letter
 
Notice Delivered To: 
Relationship to Patient: 
Representative Name: 
 
Delivery Method:  - 
Sherrill Days:
Prior Verbal Notification: 
 
Recipient Understood Notice: Yes
Recipient Signature: Yes
Med Rec Note Co-signed by Attending:
 
Coverage Notice Comment:  [facility]
 
Last DP export: 10/15/20   2:47 
Patient Name: YVON LOPEZ
 
Encounter #: [id number]
Page [phone]
 
 
 
 
 
Electronically Signed by KARIME DUMONT on 10/16/20 at 1406
 
 
 
 
 
 
**All edits/amendments must be made on the electronic document**
 
DICTATION DATE: 10/16/20 1406     : LESLIE  10/16/20 1406     
RPT#: [id number]                                DC DATE:        
                                               STATUS: ADM IN  
[facility]
[address] [place] [address]
***END OF REPORT***

## 2020-10-19 NOTE — [facility]
CASE MANAGEMENT DISCHARGE SUMMARY
 
 
PATIENT: YVON LOPEZ                   UNIT: [id number]
ACCOUNT#: [id number]                       ADM DATE: 10/10/20
AGE: 69     : 50  SEX: M            ROOM/BED: D.2224    
AUTHOR: JULIA,DOC                             PHYSICIAN:                               
 
REFERRING PHYSICIAN: SIRI WEBER MD             
DATE OF SERVICE: 10/19/20
Discharge Plan
 
 
Patient Name: YVON LOPEZ
Facility: [facility]:[place]
Encounter #: [id number]
Medical Record #: [id number]
: 1950
Planned Disposition: Home
Anticipated Discharge Date: 
 
Discharge Date: 10/16/2020
Expected LOS: 
Initial Reviewer: [id number]
Initial Review Date: 10/10/2020
Generated: 10/19/20  10:05 am 
Comments
 
DCP- Discharge Planning
 
Updated by [email or web address]: Shilpa Taylor on 10/16/20   1:08 pm CT
Patient Name: YVON LOPEZ                                     
Admission Status: ER   
Accout number: [id number]                              
Admission Date: 10-   
: 1950                                                        
Admission Diagnosis:SEPSIS, UNSPECIFIED ORGANISM   
Attending: Siri Weber                                                
Current LOS:  6   
  
Anticipated DC Date:    
Planned Disposition: Home   
Primary Insurance: [employer]   
  
  
Discharge Planning Comments: PATIENT WILL NEED HOME HEALTH FOR [facility]. 
ZUHAIR SIGNED FOR [facility]. FAXED REFERRAL. IMM SIGNED. CM TO FOLLOW AND ASSIST. 
  
  
  
 
  
  
  
  
: Shilpa Taylor
 
DCP- Discharge Planning
 
Updated by [id number]: Melanie Bird on 10/14/20   6:05 pm CT
LATE ENTRY  10/13/20   
  
Patient Name: YVON LOPEZ                                     
Admission Status: ER   
Accout number: [id number]                              
Admission Date: 10-   
: 1950                                                        
Admission Diagnosis:SEPSIS, UNSPECIFIED ORGANISM   
Attending: Siri Weber                                                
Current LOS:  4   
  
Anticipated DC Date:    
Planned Disposition: Home   
Primary Insurance: [employer]   
  
  
Discharge Planning Comments: CM met with patient to complete initial dc 
planning assessment.  CM educated patient on the CM role and verbal consent 
given by patient to complete assessment. Patient lives at home with family. 
Patient is independent. At discharge patient plans to return home and feels 
this is a safe discharge.  CM discussed availability of home health, rehab 
services, and medical equipment. Patient will have family to transport home. 
Patient denied known discharge needs at this time. CM will continue to follow 
and will assist as needed with dc plans/needs  
  
  
  
  
  
  
: Melanie Bird
 DCPIA - Discharge Planning Initial Assessment
 
Updated by [id number]: Melanie Bird on 10/14/20   6:59 pm
*  Is the patient Alert and Oriented?
Yes
*  How many steps to enter\exit or inside your home?
 
*  PCP
Sukhwinder Wagner
*  Pharmacy
[facility]
*  Preadmission Environment
 
Home with Family
*  ADLs
Independent
*  Equipment
None
*  List name and contact numbers for known caregivers / representatives who 
currently or will assist patient after discharge:
Emy Lopez - spouse - [phone]
*  Verbal permission to speak to the caregivers and representatives has been 
obtained from the patient.
Yes
*  Community resources currently utilized
None
*  Additional services required to return to the preadmission environment?
No
*  Can the patient safely return to the preadmission environment?
Yes
*  Has this patient been hospitalized within the prior 30 days at any 
hospital?
Yes
 
 
 
 
 
Coverage Notice
 
Reviewer: [email or web address] Robert Taylor
 
Notice Issued Date-Time: 10/16/2020  14:04
Notice Type: IM Discharge Notice
 
Notice Delivered To: 
Relationship to Patient: 
Representative Name: 
 
Delivery Method:  - 
Sherrill Days:
Prior Verbal Notification: 
 
Recipient Understood Notice: 
Recipient Signature: 
Med Rec Note Co-signed by Attending:
 
Coverage Notice Comment:  
Reviewer: [email or web address] Taylor
 
Notice Issued Date-Time: 10/16/2020  14:04
Notice Type: Patient Choice Letter
 
Notice Delivered To: 
Relationship to Patient: 
 
Representative Name: 
 
Delivery Method:  - 
Sherrill Days:
Prior Verbal Notification: 
 
Recipient Understood Notice: Yes
Recipient Signature: Yes
Med Rec Note Co-signed by Attending:
 
Coverage Notice Comment:  [facility]
 
Last DP export: 10/16/20   1:14 
Patient Name: YVON LOPEZ
Encounter #: [id number]
Page [phone]
 
 
 
 
 
Electronically Signed by KARIME DUMONT on 10/19/20 at 0906
 
 
 
 
 
 
**All edits/amendments must be made on the electronic document**
 
DICTATION DATE: 10/19/20 0905     : LESLIE  10/19/20 0905     
RPT#: [id number]                                DC DATE:10/16/20
                                               STATUS: DIS IN  
[address] [place] [address]
***END OF REPORT***